# Patient Record
Sex: FEMALE | Race: WHITE | NOT HISPANIC OR LATINO | Employment: UNEMPLOYED | URBAN - NONMETROPOLITAN AREA
[De-identification: names, ages, dates, MRNs, and addresses within clinical notes are randomized per-mention and may not be internally consistent; named-entity substitution may affect disease eponyms.]

---

## 2017-05-03 ENCOUNTER — HOSPITAL ENCOUNTER (EMERGENCY)
Facility: HOSPITAL | Age: 22
Discharge: HOME | End: 2017-05-03
Attending: INTERNAL MEDICINE | Admitting: INTERNAL MEDICINE
Payer: COMMERCIAL

## 2017-05-03 VITALS
DIASTOLIC BLOOD PRESSURE: 79 MMHG | RESPIRATION RATE: 18 BRPM | OXYGEN SATURATION: 98 % | TEMPERATURE: 98 F | SYSTOLIC BLOOD PRESSURE: 123 MMHG | HEART RATE: 78 BPM

## 2017-05-03 DIAGNOSIS — F32.9 REACTIVE DEPRESSION: ICD-10-CM

## 2017-05-03 DIAGNOSIS — F10.10 ALCOHOL ABUSE: ICD-10-CM

## 2017-05-03 LAB
ALBUMIN SERPL-MCNC: 3.9 G/DL (ref 3.4–5)
ALBUMIN UR-MCNC: NEGATIVE MG/DL
ALP SERPL-CCNC: 53 U/L (ref 40–150)
ALT SERPL W P-5'-P-CCNC: 17 U/L (ref 0–50)
AMPHETAMINES UR QL SCN: ABNORMAL
ANION GAP SERPL CALCULATED.3IONS-SCNC: 8 MMOL/L (ref 3–14)
APPEARANCE UR: CLEAR
AST SERPL W P-5'-P-CCNC: 15 U/L (ref 0–45)
BACTERIA #/AREA URNS HPF: ABNORMAL /HPF
BARBITURATES UR QL: ABNORMAL
BASOPHILS # BLD AUTO: 0 10E9/L (ref 0–0.2)
BASOPHILS NFR BLD AUTO: 0.5 %
BENZODIAZ UR QL: ABNORMAL
BILIRUB SERPL-MCNC: 0.3 MG/DL (ref 0.2–1.3)
BILIRUB UR QL STRIP: NEGATIVE
BUN SERPL-MCNC: 5 MG/DL (ref 7–30)
CALCIUM SERPL-MCNC: 8.3 MG/DL (ref 8.5–10.1)
CANNABINOIDS UR QL SCN: ABNORMAL
CHLORIDE SERPL-SCNC: 109 MMOL/L (ref 94–109)
CO2 SERPL-SCNC: 27 MMOL/L (ref 20–32)
COCAINE UR QL: ABNORMAL
COLOR UR AUTO: ABNORMAL
CREAT SERPL-MCNC: 0.71 MG/DL (ref 0.52–1.04)
DIFFERENTIAL METHOD BLD: ABNORMAL
EOSINOPHIL # BLD AUTO: 0.3 10E9/L (ref 0–0.7)
EOSINOPHIL NFR BLD AUTO: 4.8 %
ERYTHROCYTE [DISTWIDTH] IN BLOOD BY AUTOMATED COUNT: 12.9 % (ref 10–15)
ETHANOL SERPL-MCNC: 0.14 G/DL
GFR SERPL CREATININE-BSD FRML MDRD: ABNORMAL ML/MIN/1.7M2
GLUCOSE SERPL-MCNC: 94 MG/DL (ref 70–99)
GLUCOSE UR STRIP-MCNC: NEGATIVE MG/DL
HCG UR QL: NEGATIVE
HCT VFR BLD AUTO: 40 % (ref 35–47)
HGB BLD-MCNC: 13.6 G/DL (ref 11.7–15.7)
HGB UR QL STRIP: NEGATIVE
HYALINE CASTS #/AREA URNS LPF: 2 /LPF (ref 0–2)
IMM GRANULOCYTES # BLD: 0 10E9/L (ref 0–0.4)
IMM GRANULOCYTES NFR BLD: 0.2 %
KETONES UR STRIP-MCNC: NEGATIVE MG/DL
LEUKOCYTE ESTERASE UR QL STRIP: ABNORMAL
LYMPHOCYTES # BLD AUTO: 2.2 10E9/L (ref 0.8–5.3)
LYMPHOCYTES NFR BLD AUTO: 35.4 %
MCH RBC QN AUTO: 33.5 PG (ref 26.5–33)
MCHC RBC AUTO-ENTMCNC: 34 G/DL (ref 31.5–36.5)
MCV RBC AUTO: 99 FL (ref 78–100)
METHADONE UR QL SCN: ABNORMAL
MONOCYTES # BLD AUTO: 0.5 10E9/L (ref 0–1.3)
MONOCYTES NFR BLD AUTO: 7.5 %
MUCOUS THREADS #/AREA URNS LPF: PRESENT /LPF
NEUTROPHILS # BLD AUTO: 3.2 10E9/L (ref 1.6–8.3)
NEUTROPHILS NFR BLD AUTO: 51.6 %
NITRATE UR QL: NEGATIVE
NRBC # BLD AUTO: 0 10*3/UL
NRBC BLD AUTO-RTO: 0 /100
OPIATES UR QL SCN: ABNORMAL
PCP UR QL SCN: ABNORMAL
PH UR STRIP: 6 PH (ref 4.7–8)
PLATELET # BLD AUTO: 208 10E9/L (ref 150–450)
POTASSIUM SERPL-SCNC: 3.5 MMOL/L (ref 3.4–5.3)
PROT SERPL-MCNC: 7.5 G/DL (ref 6.8–8.8)
RBC # BLD AUTO: 4.06 10E12/L (ref 3.8–5.2)
RBC #/AREA URNS AUTO: 2 /HPF (ref 0–2)
SODIUM SERPL-SCNC: 144 MMOL/L (ref 133–144)
SP GR UR STRIP: 1 (ref 1–1.03)
SQUAMOUS #/AREA URNS AUTO: 1 /HPF (ref 0–1)
URN SPEC COLLECT METH UR: ABNORMAL
UROBILINOGEN UR STRIP-MCNC: NORMAL MG/DL (ref 0–2)
WBC # BLD AUTO: 6.3 10E9/L (ref 4–11)
WBC #/AREA URNS AUTO: 9 /HPF (ref 0–2)

## 2017-05-03 PROCEDURE — 80307 DRUG TEST PRSMV CHEM ANLYZR: CPT | Performed by: INTERNAL MEDICINE

## 2017-05-03 PROCEDURE — 99283 EMERGENCY DEPT VISIT LOW MDM: CPT | Performed by: INTERNAL MEDICINE

## 2017-05-03 PROCEDURE — 81025 URINE PREGNANCY TEST: CPT | Performed by: INTERNAL MEDICINE

## 2017-05-03 PROCEDURE — 81001 URINALYSIS AUTO W/SCOPE: CPT | Performed by: INTERNAL MEDICINE

## 2017-05-03 PROCEDURE — 80053 COMPREHEN METABOLIC PANEL: CPT | Performed by: INTERNAL MEDICINE

## 2017-05-03 PROCEDURE — 36415 COLL VENOUS BLD VENIPUNCTURE: CPT | Performed by: INTERNAL MEDICINE

## 2017-05-03 PROCEDURE — 85025 COMPLETE CBC W/AUTO DIFF WBC: CPT | Performed by: INTERNAL MEDICINE

## 2017-05-03 PROCEDURE — 80320 DRUG SCREEN QUANTALCOHOLS: CPT | Performed by: INTERNAL MEDICINE

## 2017-05-03 PROCEDURE — 99283 EMERGENCY DEPT VISIT LOW MDM: CPT

## 2017-05-03 NOTE — DISCHARGE INSTRUCTIONS
Depression  Depression is one of the most common mental health problems today. It is not just a state of unhappiness or sadness. It is a true disease. The cause seems to be related to a decrease in chemicals that transmit signals in the brain. Having a family history of depression, alcoholism, or suicide increases the risk. Chronic illness, chronic pain, migraine headaches and high emotional stress also increase the risk.  Depression is something we tend to recognize in others, but may have a hard time seeing in ourselves. It can show in many physical and emotional ways:    Loss of appetite    Over-eating    Not being able to sleep    Sleeping too much    Tiredness not related to physical exertion    Restlessness or irritability    Slowness of movement or speech    Feeling depressed or withdrawn    Loss of interest in things you once enjoyed    Trouble concentrating, poor memory, trouble making decisions    Thoughts of harming or killing oneself, or thoughts that life is not worth living    Low self-esteem  The treatment for depression may include both medicine and psychotherapy. Antidepressants can reduce suffering and can improve the ability to function during the depressed period. Therapy can offer emotional support and help you understand emotional factors that may be causing the depression.  Home care    On-going care and support helps people manage this disease.  Find a healthcare provider and therapist who meet your needs. Seek help when you feel like you may be getting ill.    Be kind to yourself. Make it a point to do things that you enjoy (gardening, walking in nature, going to a movie, etc.). Reward yourself for small successes.    Take care of your physical body. Eat a balanced diet (low in saturated fat and high in fruits and vegetables). Exercise at least 3 times a week for 30 minutes. Even mild-moderate exercise (like brisk walking) can make you feel better.    Avoid alcohol, which can make  depression worse.    Take medicine as prescribed.    Tell each of your healthcare providers about all of the prescription drugs, over-the-counter medicines, vitamins, and supplements you take. Certain supplements interact with medicines and can result in dangerous side effects. Ask your pharmacist when you have questions about drug interactions.    Talk with your family and trusted friends about your feelings and thoughts. Ask them to help you recognize behavior changes early so you can get help and, if needed, medicine can be adjusted.  Follow-up care  Follow up with your healthcare provider, or as advised.  Call 911  Call 911 if you:    Have suicidal thoughts, a suicide plan, and the means to carry out the plan    Have trouble breathing    Are very confused    Feel very drowsy or have trouble awakening    Faint or lose consciousness    Have new chest pain that becomes more severe, lasts longer, or spreads into your shoulder, arm, neck, jaw or back  When to seek medical advice  Call your healthcare provider right away if any of these occur:    Feeling extreme depression, fear, anxiety, or anger toward yourself or others    Feeling out of control    Feeling that you may try to harm yourself or another    Hearing voices that others do not hear    Seeing things that others do not see    Can t sleep or eat for 3 days in a row    Friends or family express concern over your behavior and ask you to seek help    1394-7137 [employer]. [address] [place] [address], [place] [address]. All rights reserved. This information is not intended as a substitute for professional medical care. Always follow your healthcare professional's instructions.        Alcohol Intoxication  Alcohol intoxication occurs when you drink alcohol faster than your liver can remove it from your system. The following facts are important to remember:    It can take 10 minutes or more to start to feel the effects of a drink, so you can easily get  "more intoxicated than you intended.    One drink may be more than 1 serving of alcohol. Depending on the drink, it can be 2 to 4 servings.    It takes about an hour for your body to metabolize (clear) 1 serving. If you have more than 1 drink, it can take a couple of hours or more.    Many things affect how drinks will affect you, including whether you ve eaten, how fast you drink, your size, how much you normally drink (or not), medications you take, chronic diseases you have, and gender.  Signs and symptoms of alcohol poisoning  The following are signs and symptoms of alcohol poisoning:  Mild impairment    Reduced inhibitions    Slurred speech    Drowsiness    Decreased fine motor skills  Moderate impairment    Erratic behavior, aggression, depression    Impaired judgment    Confusion    Concentration difficulties    Coordination problems  Severe impairment    Vomiting    Seizures    Unconsciousness    Cold, clammy    Slow or irregular breathing    Hypothermia (low body temperature)    Coma  Health effects  Alcohol abuse causes health problems. Sometimes this can happen after only drinking a  little.\" There is no set number of drinks or amount of alcohol that defines too much. The more you drink at one time, and the more frequently you drink determine both the short-term and long-term health effects. It affects all parts of your body and your health, including your:    Brain. Alcohol is a central nervous system depressant. It can damage parts of the brain that affect your balance, memory, thinking, and emotions. It can cause memory loss, blackouts, depression, agitation, sleep cycle changes, and seizures. These changes may or may not be reversible.    Heart and vascular system. Alcohol affects multiple areas. It can damage heart muscle causing cardiomyopathy, which is a weakening and stretching of the heart muscle. This can lead to trouble breathing, an irregular heartbeat, atrial fibrillation, leg swelling, and " heart failure. It makes the blood vessels stiffen causing hypertension (high blood pressure). All of these problems increase your risk of having heart attacks or strokes.    Liver. Alcohol causes fat to build up in the liver, affecting its normal function. This increases the risk for hepatitis, leading to abdominal pain, appetite loss, jaundice, bleeding problems, liver fibrosis, and cirrhosis. This in turn can affect your ability to fight off infections, and can cause diabetes. The liver changes prevent it from removing toxins in your blood that can cause encephalopathy. Signs of this are confusion, altered level of consciousness, personality changes, memory loss, seizures, coma, and death.    Pancreas. Alcohol can cause inflammation of the pancreas, or pancreatitis. This can cause pain in your abdomen, fever, and diabetes.    Immune system. Alcohol weakens your immune system in a number of ways. It suppresses your immune system making it harder to fight off infections and colds. You will also have a higher risk of certain infections like pneumonia and tuberculosis.    Cancer risk. Alcohol raises your risk of cancer of the mouth, esophagus, pharynx, larynx, liver, and breast.    Sexual function. Alcohol abuse can also lead to sexual problems.  Alcohol use during pregnancy may cause permanent damage to the growing baby.  Home care  The following guidelines will help you care for yourself at home:    Don't drink any more alcohol.    Don't drive until all effects of the alcohol have worn off.    Don't operate machinery that can cause injuries.    Get lots of rest over the next few days. Drink plenty of water and other non-alcoholic liquids. Try to eat regular meals.    If you have been drinking heavily on a daily basis, you may go through alcohol withdrawal. The usual symptoms last 3 to 4 days and may include nervousness, shakiness, nausea, sweating, sleeplessness, and can even cause seizures and a serious withdrawal  symptom called delirium tremens, or DTs. During this time, it is best that you stay with family or friends who can help and support you. You can also admit yourself to a residential detox program. If your symptoms are severe (seizures, severe shakiness, confusion), contact your doctor or call an ambulance for help (see below).   Follow-up care  If alcohol is a problem in your life, these are some organizations that can help you:    Alcoholics Anonymous offers support through a self-help fellowship. There are no dues or fees. See the Yellow Pages and call for time and place of meetings. Find AA online at www.aa.org.    [employer] offers support to families of alcohol users. Contact 679-783-6832, or online at www.al-anon.org.    National [place] on Alcoholism and Drug Dependence can be reached at 890-287-6120, or online at www.ncadd.org.    There are also inpatient and residential alcohol detox programs. Check the Internet or phonebook Yellow Pages under  [facility].   Call 911  Call 911 if any of these occur:    Trouble breathing or slow irregular breathing    Chest pain    Sudden weakness on one side of your body or sudden trouble speaking    Heavy bleeding or vomiting blood    Very drowsy or trouble awakening    Fainting or loss of consciousness    Rapid heart rate    Seizure  When to seek medical care  Get prompt medical attention if any of the following occur:    Severe shakiness     Fever over 100.4  F (38.0  C)    Confusion or hallucinations (seeing, hearing, or feeling things that are not there)    Pain in your upper abdomen that gets worse    Repeated vomiting    2169-8944 [employer]. [address] [place] [address], [place] [address]. All rights reserved. This information is not intended as a substitute for professional medical care. Always follow your healthcare professional's instructions.

## 2017-05-03 NOTE — ED AVS SNAPSHOT
[facility]    [address] [address]    Phone:  [phone]                                       Reshma Martinez   MRN: [id number]    Department:  [facility]   Date of Visit:  5/3/2017           After Visit Summary Signature Page     I have received my discharge instructions, and my questions have been answered. I have discussed any challenges I see with this plan with the nurse or doctor.    ..........................................................................................................................................  Patient/Patient Representative Signature      ..........................................................................................................................................  Patient Representative Print Name and Relationship to Patient    ..................................................               ................................................  Date                                            Time    ..........................................................................................................................................  Reviewed by Signature/Title    ...................................................              ..............................................  Date                                                            Time

## 2017-05-03 NOTE — ED NOTES
"Pt arrived with [place] [employer] for a psych evaluation.  Friend called reporting pt was sending text messages saying she wanted to harm herself.  Pt was initially arguing with staff about being here,  pt agreed to finally get in our scrubs.  Pt has approx 6 self inflicted superficial cuts to inner forearm (pt pulled arm away and won't let me see them.)  She reports she has a hx of cutting since she was 10 yrs old.  Pt keeps stating \"I am not staying I have a job and a 3 yr old kid.  I would never hurt myself.\" [employer] placed her on a hold. Security in place outside of room.  Pt texting on phone, instructed her she could keep her phone if she cooperates with staff.   "

## 2017-05-03 NOTE — ED NOTES
"Dec called and said \" we have one person ahead of the pt it will be about 45 mins to an hour before we will see the pt.\"  "

## 2017-05-03 NOTE — ED AVS SNAPSHOT
[facility]    [address] [address]    Phone:  [phone]                                       Reshma Martinez   MRN: [id number]    Department:  [facility]   Date of Visit:  5/3/2017           Patient Information     Date Of Birth          1995        Your diagnoses for this visit were:     Alcohol abuse     Reactive depression        You were seen by Gerald Rosales MD.      Follow-up Information     Follow up with [facility].    Contact information:    [address] [place] [address]  [phone]          Discharge Instructions         Depression  Depression is one of the most common mental health problems today. It is not just a state of unhappiness or sadness. It is a true disease. The cause seems to be related to a decrease in chemicals that transmit signals in the brain. Having a family history of depression, alcoholism, or suicide increases the risk. Chronic illness, chronic pain, migraine headaches and high emotional stress also increase the risk.  Depression is something we tend to recognize in others, but may have a hard time seeing in ourselves. It can show in many physical and emotional ways:    Loss of appetite    Over-eating    Not being able to sleep    Sleeping too much    Tiredness not related to physical exertion    Restlessness or irritability    Slowness of movement or speech    Feeling depressed or withdrawn    Loss of interest in things you once enjoyed    Trouble concentrating, poor memory, trouble making decisions    Thoughts of harming or killing oneself, or thoughts that life is not worth living    Low self-esteem  The treatment for depression may include both medicine and psychotherapy. Antidepressants can reduce suffering and can improve the ability to function during the depressed period. Therapy can offer emotional support and help you understand emotional factors that may be causing the depression.  Home  care    On-going care and support helps people manage this disease.  Find a healthcare provider and therapist who meet your needs. Seek help when you feel like you may be getting ill.    Be kind to yourself. Make it a point to do things that you enjoy (gardening, walking in nature, going to a movie, etc.). Reward yourself for small successes.    Take care of your physical body. Eat a balanced diet (low in saturated fat and high in fruits and vegetables). Exercise at least 3 times a week for 30 minutes. Even mild-moderate exercise (like brisk walking) can make you feel better.    Avoid alcohol, which can make depression worse.    Take medicine as prescribed.    Tell each of your healthcare providers about all of the prescription drugs, over-the-counter medicines, vitamins, and supplements you take. Certain supplements interact with medicines and can result in dangerous side effects. Ask your pharmacist when you have questions about drug interactions.    Talk with your family and trusted friends about your feelings and thoughts. Ask them to help you recognize behavior changes early so you can get help and, if needed, medicine can be adjusted.  Follow-up care  Follow up with your healthcare provider, or as advised.  Call 911  Call 911 if you:    Have suicidal thoughts, a suicide plan, and the means to carry out the plan    Have trouble breathing    Are very confused    Feel very drowsy or have trouble awakening    Faint or lose consciousness    Have new chest pain that becomes more severe, lasts longer, or spreads into your shoulder, arm, neck, jaw or back  When to seek medical advice  Call your healthcare provider right away if any of these occur:    Feeling extreme depression, fear, anxiety, or anger toward yourself or others    Feeling out of control    Feeling that you may try to harm yourself or another    Hearing voices that others do not hear    Seeing things that others do not see    Can t sleep or eat for 3  "days in a row    Friends or family express concern over your behavior and ask you to seek help    [phone] [employer]. [address] [place] [address], [place] [address]. All rights reserved. This information is not intended as a substitute for professional medical care. Always follow your healthcare professional's instructions.        Alcohol Intoxication  Alcohol intoxication occurs when you drink alcohol faster than your liver can remove it from your system. The following facts are important to remember:    It can take 10 minutes or more to start to feel the effects of a drink, so you can easily get more intoxicated than you intended.    One drink may be more than 1 serving of alcohol. Depending on the drink, it can be 2 to 4 servings.    It takes about an hour for your body to metabolize (clear) 1 serving. If you have more than 1 drink, it can take a couple of hours or more.    Many things affect how drinks will affect you, including whether you ve eaten, how fast you drink, your size, how much you normally drink (or not), medications you take, chronic diseases you have, and gender.  Signs and symptoms of alcohol poisoning  The following are signs and symptoms of alcohol poisoning:  Mild impairment    Reduced inhibitions    Slurred speech    Drowsiness    Decreased fine motor skills  Moderate impairment    Erratic behavior, aggression, depression    Impaired judgment    Confusion    Concentration difficulties    Coordination problems  Severe impairment    Vomiting    Seizures    Unconsciousness    Cold, clammy    Slow or irregular breathing    Hypothermia (low body temperature)    Coma  Health effects  Alcohol abuse causes health problems. Sometimes this can happen after only drinking a  little.\" There is no set number of drinks or amount of alcohol that defines too much. The more you drink at one time, and the more frequently you drink determine both the short-term and long-term health effects. It " affects all parts of your body and your health, including your:    Brain. Alcohol is a central nervous system depressant. It can damage parts of the brain that affect your balance, memory, thinking, and emotions. It can cause memory loss, blackouts, depression, agitation, sleep cycle changes, and seizures. These changes may or may not be reversible.    Heart and vascular system. Alcohol affects multiple areas. It can damage heart muscle causing cardiomyopathy, which is a weakening and stretching of the heart muscle. This can lead to trouble breathing, an irregular heartbeat, atrial fibrillation, leg swelling, and heart failure. It makes the blood vessels stiffen causing hypertension (high blood pressure). All of these problems increase your risk of having heart attacks or strokes.    Liver. Alcohol causes fat to build up in the liver, affecting its normal function. This increases the risk for hepatitis, leading to abdominal pain, appetite loss, jaundice, bleeding problems, liver fibrosis, and cirrhosis. This in turn can affect your ability to fight off infections, and can cause diabetes. The liver changes prevent it from removing toxins in your blood that can cause encephalopathy. Signs of this are confusion, altered level of consciousness, personality changes, memory loss, seizures, coma, and death.    Pancreas. Alcohol can cause inflammation of the pancreas, or pancreatitis. This can cause pain in your abdomen, fever, and diabetes.    Immune system. Alcohol weakens your immune system in a number of ways. It suppresses your immune system making it harder to fight off infections and colds. You will also have a higher risk of certain infections like pneumonia and tuberculosis.    Cancer risk. Alcohol raises your risk of cancer of the mouth, esophagus, pharynx, larynx, liver, and breast.    Sexual function. Alcohol abuse can also lead to sexual problems.  Alcohol use during pregnancy may cause permanent damage to the  growing baby.  Home care  The following guidelines will help you care for yourself at home:    Don't drink any more alcohol.    Don't drive until all effects of the alcohol have worn off.    Don't operate machinery that can cause injuries.    Get lots of rest over the next few days. Drink plenty of water and other non-alcoholic liquids. Try to eat regular meals.    If you have been drinking heavily on a daily basis, you may go through alcohol withdrawal. The usual symptoms last 3 to 4 days and may include nervousness, shakiness, nausea, sweating, sleeplessness, and can even cause seizures and a serious withdrawal symptom called delirium tremens, or DTs. During this time, it is best that you stay with family or friends who can help and support you. You can also admit yourself to a residential detox program. If your symptoms are severe (seizures, severe shakiness, confusion), contact your doctor or call an ambulance for help (see below).   Follow-up care  If alcohol is a problem in your life, these are some organizations that can help you:    Alcoholics Anonymous offers support through a self-help fellowship. There are no dues or fees. See the Yellow Pages and call for time and place of meetings. Find AA online at www.aa.org.    [employer] offers support to families of alcohol users. Contact 254-503-6444, or online at www.al-anon.org.    National [place] on Alcoholism and Drug Dependence can be reached at 468-396-0430, or online at www.ncadd.org.    There are also inpatient and residential alcohol detox programs. Check the Internet or phonebook Yellow Pages under  Drug Abuse and Treatment Centers.   Call 911  Call 911 if any of these occur:    Trouble breathing or slow irregular breathing    Chest pain    Sudden weakness on one side of your body or sudden trouble speaking    Heavy bleeding or vomiting blood    Very drowsy or trouble awakening    Fainting or loss of consciousness    Rapid heart rate    Seizure  When to  "seek medical care  Get prompt medical attention if any of the following occur:    Severe shakiness     Fever over 100.4  F (38.0  C)    Confusion or hallucinations (seeing, hearing, or feeling things that are not there)    Pain in your upper abdomen that gets worse    Repeated vomiting    [phone] [employer]. [address] [place] [address]. All rights reserved. This information is not intended as a substitute for professional medical care. Always follow your healthcare professional's instructions.          Future Appointments        Provider Department Dept Phone Center    7/12/2017 2:00 PM Britni Ludwig MD, MD [facility] [phone] [facility] [place]         Review of your medicines      Notice     You have not been prescribed any medications.            Procedures and tests performed during your visit     Alcohol ethyl    CBC with platelets differential    Comprehensive metabolic panel    Drug Screen Urine ([facility])    HCG qualitative urine    UA reflex to Microscopic      Orders Needing Specimen Collection     None      Pending Results     Date and Time Order Name Status Description    5/3/2017 0435 Drug Screen Urine ([facility]) In process             Pending Culture Results     Date and Time Order Name Status Description    5/3/2017 0435 Drug Screen Urine ([facility]) In process             Thank you for choosing [facility]       Thank you for choosing [facility] for your care. Our goal is always to provide you with excellent care. Hearing back from our patients is one way we can continue to improve our services. Please take a few minutes to complete the written survey that you may receive in the mail after you visit with us. Thank you!        MyChart Information     [email or web address] lets you send messages to your doctor, view your test results, renew your prescriptions, schedule appointments and more. To sign up, go to [email or web address] . Click on \"Log in\" on the left side of the screen, " "which will take you to the Welcome page. Then click on \"Sign up Now\" on the right side of the page.     You will be asked to enter the access code listed below, as well as some personal information. Please follow the directions to create your username and password.     Your access code is: [email or web address]  Expires: 5/15/2017 12:17 PM     Your access code will  in 90 days. If you need help or a new code, please call your [facility] or [phone].        Care EveryWhere ID     This is your Care EveryWhere ID. This could be used by other organizations to access your [facility] medical records  [id number]        After Visit Summary       This is your record. Keep this with you and show to your community pharmacist(s) and doctor(s) at your next visit.                  "

## 2017-05-05 ASSESSMENT — ENCOUNTER SYMPTOMS
WOUND: 0
SLEEP DISTURBANCE: 0
ABDOMINAL PAIN: 0
DYSURIA: 0
CHILLS: 0
DIAPHORESIS: 0
HEMATURIA: 0
VOMITING: 0
SHORTNESS OF BREATH: 0
PALPITATIONS: 0
DELUSIONS: 0
ANAL BLEEDING: 0
DECREASED CONCENTRATION: 0
BACK PAIN: 0
VOICE CHANGE: 0
FEVER: 0
MYALGIAS: 0
NUMBNESS: 0
DIZZINESS: 0
NAUSEA: 0
BLOOD IN STOOL: 0
COLOR CHANGE: 0
COUGH: 0
HEADACHES: 0
WHEEZING: 0
ABDOMINAL DISTENTION: 0
NECK STIFFNESS: 0
NECK PAIN: 0
ARTHRALGIAS: 0
NERVOUS/ANXIOUS: 0
AGITATION: 0
DISORGANIZED SPEECH: 0
DYSPHORIC MOOD: 0
DEPRESSED MOOD: 0
CHEST TIGHTNESS: 0
HYPERACTIVE: 0
CONFUSION: 0
FLANK PAIN: 0
AGGRESSION: 0
LIGHT-HEADEDNESS: 0
HOMICIDAL IDEAS: 0
HALLUCINATIONS: 0
PARANOIA: 0
BIZARRE BEHAVIOR: 0

## 2017-05-06 NOTE — ED PROVIDER NOTES
History     Chief Complaint   Patient presents with     Psychiatric Evaluation     Patient is a 22 year old female presenting with mental health disorder. The history is provided by the patient and the police.   Mental Health Problem   Presenting symptoms: no aggressive behavior, no agitation, no bizarre behavior, no delusions, no depression, no disorganized speech, no disorganized thought process, no hallucinations, no homicidal ideas, no paranoid behavior, no self-mutilation, no suicidal thoughts, no suicidal threats and no suicide attempt    Associated symptoms: no abdominal pain, no anxiety, no chest pain and no headaches          I have reviewed the Medications, Allergies, Past Medical and Surgical History, and Social History in the Epic system.    Review of Systems   Constitutional: Negative for chills, diaphoresis and fever.   HENT: Negative for voice change.    Eyes: Negative for visual disturbance.   Respiratory: Negative for cough, chest tightness, shortness of breath and wheezing.    Cardiovascular: Negative for chest pain, palpitations and leg swelling.   Gastrointestinal: Negative for abdominal distention, abdominal pain, anal bleeding, blood in stool, nausea and vomiting.   Genitourinary: Negative for decreased urine volume, dysuria, flank pain and hematuria.   Musculoskeletal: Negative for arthralgias, back pain, gait problem, myalgias, neck pain and neck stiffness.   Skin: Negative for color change, pallor, rash and wound.   Neurological: Negative for dizziness, syncope, light-headedness, numbness and headaches.   Psychiatric/Behavioral: Negative for agitation, behavioral problems, confusion, decreased concentration, dysphoric mood, hallucinations, homicidal ideas, paranoia, self-injury, sleep disturbance and suicidal ideas. The patient is not nervous/anxious and is not hyperactive.        Physical Exam   BP: 124/88  Pulse: 97  Temp: 97.2  F (36.2  C)  Resp: 16  SpO2: 100 %  Physical Exam    Constitutional: She is oriented to person, place, and time. She appears well-developed and well-nourished.   HENT:   Head: Normocephalic and atraumatic.   Mouth/Throat: No oropharyngeal exudate.   Eyes: Conjunctivae are normal. Pupils are equal, round, and reactive to light.   Neck: Normal range of motion. Neck supple. No JVD present. No tracheal deviation present. No thyromegaly present.   Cardiovascular: Normal rate, regular rhythm, normal heart sounds and intact distal pulses.  Exam reveals no gallop and no friction rub.    No murmur heard.  Pulmonary/Chest: Effort normal and breath sounds normal. No stridor. No respiratory distress. She has no wheezes. She has no rales. She exhibits no tenderness.   Abdominal: Soft. Bowel sounds are normal. She exhibits no distension and no mass. There is no tenderness. There is no rebound and no guarding.   Musculoskeletal: Normal range of motion. She exhibits no edema or tenderness.   Lymphadenopathy:     She has no cervical adenopathy.   Neurological: She is alert and oriented to person, place, and time.   Skin: Skin is warm and dry. No rash noted. No erythema. No pallor.        Multiple superificial cut on left forearm   Psychiatric: She has a normal mood and affect. Her speech is normal and behavior is normal. Judgment normal. Her mood appears not anxious. Her affect is not angry, not blunt, not labile and not inappropriate. Thought content is not paranoid and not delusional. Cognition and memory are normal. She does not exhibit a depressed mood. She expresses no homicidal and no suicidal ideation. She expresses no suicidal plans and no homicidal plans.   Nursing note and vitals reviewed.      ED Course     ED Course     Procedures               Labs Ordered and Resulted from Time of ED Arrival Up to the Time of Departure from the ED   DRUG SCREEN URINE ([facility]) - Abnormal; Notable for the following:        Result Value    Opiates Qualitative Urine   (*)     Value:  Positive   Cutoff for a positive opiate is greater than 300 ng/mL. This is an unconfirmed   screening result to be used for medical purposes only.      All other components within normal limits   URINE MACROSCOPIC WITH REFLEX TO MICRO - Abnormal; Notable for the following:     Leukocyte Esterase Urine Small (*)     WBC Urine 9 (*)     Bacteria Urine None (*)     Mucous Urine Present (*)     Hyaline Casts 2 (*)     All other components within normal limits   ALCOHOL ETHYL - Abnormal; Notable for the following:     Ethanol g/dL 0.14 (*)     All other components within normal limits   CBC WITH PLATELETS DIFFERENTIAL - Abnormal; Notable for the following:     MCH 33.5 (*)     All other components within normal limits   COMPREHENSIVE METABOLIC PANEL - Abnormal; Notable for the following:     Urea Nitrogen 5 (*)     Calcium 8.3 (*)     All other components within normal limits   HCG QUALITATIVE URINE       Assessments & Plan (with Medical Decision Making)   Brought because she sent message to her boyfriend that she wants hurt herself  In ER [facility], proper congnitive status and judgment is normal , mood is normal .   When I asked what was the reason that she texted her boyfriend regarding suicidal comment, she answered she just needed more attention from her boyfriend and never thought will end up to hospital  Also she declared that she cut her arm with carefully superficial tor relieve her stress and shows affection to her boyfriend  In my opinion she is not at risk of hurting herself or others  She agreed to follow with her therapist  I have reviewed the nursing notes.    I have reviewed the findings, diagnosis, plan and need for follow up with the patient.    There are no discharge medications for this patient.      Final diagnoses:   Alcohol abuse   Reactive depression       5/3/2017   [facility]     Gerald Rosalse MD  05/05/17 9768

## 2017-08-29 ENCOUNTER — HOSPITAL ENCOUNTER (EMERGENCY)
Facility: HOSPITAL | Age: 22
Discharge: HOME | End: 2017-08-29
Payer: COMMERCIAL

## 2017-08-29 VITALS
SYSTOLIC BLOOD PRESSURE: 131 MMHG | TEMPERATURE: 99.1 F | DIASTOLIC BLOOD PRESSURE: 78 MMHG | HEART RATE: 70 BPM | OXYGEN SATURATION: 98 % | RESPIRATION RATE: 17 BRPM

## 2017-08-29 DIAGNOSIS — N83.201 RIGHT OVARIAN CYST: ICD-10-CM

## 2017-08-29 DIAGNOSIS — N39.0 URINARY TRACT INFECTION WITH HEMATURIA, SITE UNSPECIFIED: ICD-10-CM

## 2017-08-29 DIAGNOSIS — R31.9 URINARY TRACT INFECTION WITH HEMATURIA, SITE UNSPECIFIED: ICD-10-CM

## 2017-08-29 LAB
ALBUMIN SERPL-MCNC: 3.7 G/DL (ref 3.4–5)
ALBUMIN UR-MCNC: 10 MG/DL
ALP SERPL-CCNC: 69 U/L (ref 40–150)
ALT SERPL W P-5'-P-CCNC: 21 U/L (ref 0–50)
AMPHETAMINES UR QL SCN: NEGATIVE
ANION GAP SERPL CALCULATED.3IONS-SCNC: 8 MMOL/L (ref 3–14)
APPEARANCE UR: ABNORMAL
AST SERPL W P-5'-P-CCNC: 16 U/L (ref 0–45)
BACTERIA #/AREA URNS HPF: ABNORMAL /HPF
BARBITURATES UR QL: NEGATIVE
BASOPHILS # BLD AUTO: 0.1 10E9/L (ref 0–0.2)
BASOPHILS NFR BLD AUTO: 0.5 %
BENZODIAZ UR QL: NEGATIVE
BILIRUB SERPL-MCNC: 0.5 MG/DL (ref 0.2–1.3)
BILIRUB UR QL STRIP: NEGATIVE
BUN SERPL-MCNC: 8 MG/DL (ref 7–30)
CALCIUM SERPL-MCNC: 9 MG/DL (ref 8.5–10.1)
CANNABINOIDS UR QL SCN: POSITIVE
CHLORIDE SERPL-SCNC: 107 MMOL/L (ref 94–109)
CO2 SERPL-SCNC: 25 MMOL/L (ref 20–32)
COCAINE UR QL: NEGATIVE
COLOR UR AUTO: YELLOW
CREAT SERPL-MCNC: 0.7 MG/DL (ref 0.52–1.04)
DIFFERENTIAL METHOD BLD: ABNORMAL
EOSINOPHIL # BLD AUTO: 0.2 10E9/L (ref 0–0.7)
EOSINOPHIL NFR BLD AUTO: 1.7 %
ERYTHROCYTE [DISTWIDTH] IN BLOOD BY AUTOMATED COUNT: 12.8 % (ref 10–15)
GFR SERPL CREATININE-BSD FRML MDRD: >90 ML/MIN/1.7M2
GLUCOSE SERPL-MCNC: 81 MG/DL (ref 70–99)
GLUCOSE UR STRIP-MCNC: NEGATIVE MG/DL
HCG UR QL: NEGATIVE
HCT VFR BLD AUTO: 42.9 % (ref 35–47)
HGB BLD-MCNC: 15 G/DL (ref 11.7–15.7)
HGB UR QL STRIP: ABNORMAL
IMM GRANULOCYTES # BLD: 0.1 10E9/L (ref 0–0.4)
IMM GRANULOCYTES NFR BLD: 0.5 %
KETONES UR STRIP-MCNC: 10 MG/DL
LEUKOCYTE ESTERASE UR QL STRIP: ABNORMAL
LYMPHOCYTES # BLD AUTO: 1.1 10E9/L (ref 0.8–5.3)
LYMPHOCYTES NFR BLD AUTO: 11 %
MCH RBC QN AUTO: 34.6 PG (ref 26.5–33)
MCHC RBC AUTO-ENTMCNC: 35 G/DL (ref 31.5–36.5)
MCV RBC AUTO: 99 FL (ref 78–100)
METHADONE UR QL SCN: NEGATIVE
MONOCYTES # BLD AUTO: 0.9 10E9/L (ref 0–1.3)
MONOCYTES NFR BLD AUTO: 9.4 %
MUCOUS THREADS #/AREA URNS LPF: PRESENT /LPF
NEUTROPHILS # BLD AUTO: 7.6 10E9/L (ref 1.6–8.3)
NEUTROPHILS NFR BLD AUTO: 76.9 %
NITRATE UR QL: NEGATIVE
NRBC # BLD AUTO: 0 10*3/UL
NRBC BLD AUTO-RTO: 0 /100
OPIATES UR QL SCN: NEGATIVE
PCP UR QL SCN: NEGATIVE
PH UR STRIP: 5.5 PH (ref 4.7–8)
PLATELET # BLD AUTO: 186 10E9/L (ref 150–450)
POTASSIUM SERPL-SCNC: 3.9 MMOL/L (ref 3.4–5.3)
PROT SERPL-MCNC: 7.8 G/DL (ref 6.8–8.8)
RBC # BLD AUTO: 4.34 10E12/L (ref 3.8–5.2)
RBC #/AREA URNS AUTO: 6 /HPF (ref 0–2)
SODIUM SERPL-SCNC: 140 MMOL/L (ref 133–144)
SOURCE: ABNORMAL
SP GR UR STRIP: 1.01 (ref 1–1.03)
SQUAMOUS #/AREA URNS AUTO: 1 /HPF (ref 0–1)
TRANS CELLS #/AREA URNS HPF: 1 /HPF (ref 0–1)
UROBILINOGEN UR STRIP-MCNC: NORMAL MG/DL (ref 0–2)
WBC # BLD AUTO: 9.9 10E9/L (ref 4–11)
WBC #/AREA URNS AUTO: 68 /HPF (ref 0–2)
WBC CLUMPS #/AREA URNS HPF: PRESENT /HPF

## 2017-08-29 PROCEDURE — 74176 CT ABD & PELVIS W/O CONTRAST: CPT | Mod: TC

## 2017-08-29 PROCEDURE — 87186 SC STD MICRODIL/AGAR DIL: CPT

## 2017-08-29 PROCEDURE — 80053 COMPREHEN METABOLIC PANEL: CPT

## 2017-08-29 PROCEDURE — 99285 EMERGENCY DEPT VISIT HI MDM: CPT

## 2017-08-29 PROCEDURE — 87491 CHLMYD TRACH DNA AMP PROBE: CPT

## 2017-08-29 PROCEDURE — 25000132 ZZH RX MED GY IP 250 OP 250 PS 637

## 2017-08-29 PROCEDURE — 85025 COMPLETE CBC W/AUTO DIFF WBC: CPT

## 2017-08-29 PROCEDURE — 87088 URINE BACTERIA CULTURE: CPT

## 2017-08-29 PROCEDURE — 80307 DRUG TEST PRSMV CHEM ANLYZR: CPT

## 2017-08-29 PROCEDURE — 81001 URINALYSIS AUTO W/SCOPE: CPT | Mod: 59

## 2017-08-29 PROCEDURE — 87591 N.GONORRHOEAE DNA AMP PROB: CPT

## 2017-08-29 PROCEDURE — 81025 URINE PREGNANCY TEST: CPT

## 2017-08-29 PROCEDURE — 36415 COLL VENOUS BLD VENIPUNCTURE: CPT

## 2017-08-29 PROCEDURE — 99284 EMERGENCY DEPT VISIT MOD MDM: CPT | Mod: 25

## 2017-08-29 PROCEDURE — 87086 URINE CULTURE/COLONY COUNT: CPT

## 2017-08-29 RX ORDER — ACETAMINOPHEN 325 MG/1
975 TABLET ORAL ONCE
Status: COMPLETED | OUTPATIENT
Start: 2017-08-29 | End: 2017-08-29

## 2017-08-29 RX ORDER — CIPROFLOXACIN 500 MG/1
500 TABLET, FILM COATED ORAL 2 TIMES DAILY
Qty: 14 TABLET | Refills: 0 | Status: SHIPPED | OUTPATIENT
Start: 2017-08-29 | End: 2017-09-05

## 2017-08-29 RX ORDER — IBUPROFEN 800 MG/1
800 TABLET, FILM COATED ORAL ONCE
Status: COMPLETED | OUTPATIENT
Start: 2017-08-29 | End: 2017-08-29

## 2017-08-29 RX ORDER — IBUPROFEN 800 MG/1
800 TABLET, FILM COATED ORAL EVERY 8 HOURS PRN
Qty: 60 TABLET | Refills: 0 | Status: SHIPPED | OUTPATIENT
Start: 2017-08-29 | End: 2017-09-06

## 2017-08-29 RX ORDER — CIPROFLOXACIN 500 MG/1
500 TABLET, FILM COATED ORAL ONCE
Status: COMPLETED | OUTPATIENT
Start: 2017-08-29 | End: 2017-08-29

## 2017-08-29 RX ADMIN — ACETAMINOPHEN 975 MG: 325 TABLET, FILM COATED ORAL at 19:33

## 2017-08-29 RX ADMIN — IBUPROFEN 800 MG: 800 TABLET ORAL at 19:33

## 2017-08-29 RX ADMIN — CIPROFLOXACIN HYDROCHLORIDE 500 MG: 500 TABLET, FILM COATED ORAL at 19:33

## 2017-08-29 ASSESSMENT — ENCOUNTER SYMPTOMS
SHORTNESS OF BREATH: 0
ABDOMINAL PAIN: 1
CONFUSION: 0
NERVOUS/ANXIOUS: 1
ARTHRALGIAS: 0
FREQUENCY: 1
COLOR CHANGE: 0
HEADACHES: 0
FEVER: 0
EYE REDNESS: 0
NECK STIFFNESS: 0
DIFFICULTY URINATING: 0

## 2017-08-29 NOTE — ED AVS SNAPSHOT
[facility]    [address] [address]    Phone:  [phone]                                       Reshma Martinez   MRN: [id number]    Department:  [facility]   Date of Visit:  8/29/2017           Patient Information     Date Of Birth          1995        Your diagnoses for this visit were:     Urinary tract infection with hematuria, site unspecified     Right ovarian cyst        You were seen by Yaquelin Jane APRN FNP.      Follow-up Information     Follow up with PCP In 1 week.    Why:  recheck        Follow up with [facility].    Specialty:  EMERGENCY MEDICINE    Why:  As needed, If symptoms worsen    Contact information:    [address] [address] [phone]    Additional information:    From [facility]: Take [address]. Turn left at [address]. Turn left at the first stoplight on [address]. At the first stop sign, take a right onto [address]. Take a left into the parking lot and continue through until you reach the North enterance of the building.       From [place]: Take [address]. Take the [address] ramp towards [place]. Turn left onto [address]. Take a slight right onto [address]. Turn left at the first stoplight on [address]. At the first stop sign, take a right onto [address]. Take a left into the parking lot and continue through until you reach the North enterance of the building.       From [place]: Take [address]. Take a right at [address]. At the first stop sign, take a right onto [address]. Take a left into the parking lot and continue through until you reach the North enterance of the building.         Discharge Instructions            See attached for home care  Rest, increase fluids, cranberry juice  Take all of antibiotics as prescribed  Take ibuprofen for pain  F/U with PCP if not improving or back to  base line in 1 week  Return to ED/UC with worsening symptoms.   Ovarian Cysts    Ovarian cysts are sacs filled with fluid or tissue that form on or inside the ovaries. The ovaries are two small organs located on each side of a woman s uterus (womb). They are part of the female reproductive system.  Ovarian cysts are common in women, especially during childbearing years. There are different types of cysts. Most are harmless (benign) and go away on their own. They often cause no symptoms. If symptoms do occur, they can include mild pain or pressure in the lower belly (abdomen).  Cysts that are large or break (rupture) may cause more severe pain and symptoms. In these cases, hospital care or treatment such as surgery may be needed. More extensive treatment may also be needed if a cyst causes an ovary to twist (called torsion) or if a cyst is suspected to be cancerous. Keep in mind that most cysts are not cancerous, however.  General care    To help relieve pain, your healthcare provider may recommend using over-the-counter pain medicine. If needed, stronger pain medicine may be prescribed.    Depending on the type of cyst you have, your healthcare provider may advise taking birth control pills. These help shrink cysts in certain cases. They may also help prevent new cysts from forming. Be sure to take these medicines as directed if they are prescribed.    Your healthcare provider may advise you to watch your symptoms over time to see if they go away or worsen. Regular ultrasound tests may also be advised. These can help check if a cyst goes away or grows in size.  Follow-up care  Follow up with your healthcare provider, or as advised.  When to seek medical advice  Call your healthcare provider right away if any of these occur:    Pain worsens or fails to get better with home treatment    Fever of 100.4 F (38 C) or higher (or other fever amount directed by your healthcare provider)    Nausea and vomiting    Weakness,  "dizziness, or fainting    Abnormal vaginal bleeding  Date Last Reviewed: 6/11/2015 2000-2017 [employer]. [address] [place] [address], [place] [address]. All rights reserved. This information is not intended as a substitute for professional medical care. Always follow your healthcare professional's instructions.        * BLADDER INFECTION,Female (Adult)    A bladder infection (\"cystitis\" or \"UTI\") usually causes a constant urge to urinate and a burning when passing urine. Urine may be cloudy, smelly or dark. There may be pain in the lower abdomen. A bladder infection occurs when bacteria from the vaginal area enter the bladder opening (urethra). This can occur from sexual intercourse, wearing tight clothing, dehydration and other factors.  HOME CARE:  1. Drink lots of fluids (at least 6-8 glasses a day, unless you must restrict fluids for other medical reasons). This will force the medicine into your urinary system and flush the bacteria out of your body. Cranberry juice has been shown to help clear out the bacteria.  2. Avoid sexual intercourse until your symptoms are gone.  3. A bladder infection is treated with antibiotics. You may also be given Pyridium (generic = phenazopyridine) to reduce the burning sensation. This medicine will cause your urine to become a bright orange color. The orange urine may stain clothing. You may wear a pad or panty-liner to protect clothing.  PREVENTING FUTURE INFECTIONS:  1. Always wipe from front to back after a bowel movement.  2. Keep the genital area clean and dry.  3. Drink plenty of fluids each day to avoid dehydration.  4. Urinate right after intercourse to flush out the bladder.  5. Wear cotton underwear and cotton-lined panty hose; avoid tight-fitting pants.  6. If you are on birth control pills and are having frequent bladder infections, discuss with your doctor.  FOLLOW UP: Return to this facility or see your doctor if ALL symptoms are not gone after three " days of treatment.  GET PROMPT MEDICAL ATTENTION if any of the following occur:    Fever over 101 F (38.3 C)    No improvement by the third day of treatment    Increasing back or abdominal pain    Repeated vomiting; unable to keep medicine down    Weakness, dizziness or fainting    Vaginal discharge    Pain, redness or swelling in the labia (outer vaginal area)    7456-6455 [employer], [address] [place] [address], [place] [address]. All rights reserved. This information is not intended as a substitute for professional medical care. Always follow your healthcare professional's instructions.         Review of your medicines      START taking        Dose / Directions Last dose taken    ciprofloxacin 500 MG tablet   Commonly known as:  CIPRO   Dose:  500 mg   Quantity:  14 tablet        Take 1 tablet (500 mg) by mouth 2 times daily for 7 days   Refills:  0        ibuprofen 800 MG tablet   Commonly known as:  ADVIL/MOTRIN   Dose:  800 mg   Quantity:  60 tablet        Take 1 tablet (800 mg) by mouth every 8 hours as needed for moderate pain   Refills:  0                Prescriptions were sent or printed at these locations (2 Prescriptions)                   [facility] [address] [place] [address] [place] - [address] AT [facility] [address]   [address] [address]    Telephone:  [phone]   Fax:  [phone]   Hours:                  E-Prescribed (2 of 2)         ciprofloxacin (CIPRO) 500 MG tablet               ibuprofen (ADVIL/MOTRIN) 800 MG tablet                Procedures and tests performed during your visit     CBC with platelets differential    CT Abdomen Pelvis w/o Contrast (stone protocol)    Chlamydia trachomatis PCR    Comprehensive metabolic panel    Drug Screen Urine (Range)    HCG qualitative urine    Neisseria gonorrhoea PCR    UA reflex to Microscopic and Culture    Urine Culture Aerobic Bacterial      Orders Needing Specimen Collection     None      Pending Results     Date and Time  "Order Name Status Description    2017 1904 CT Abdomen Pelvis w/o Contrast (stone protocol) In process     2017 1828 Urine Culture Aerobic Bacterial In process     2017 1803 Neisseria gonorrhoea PCR In process     2017 1803 Chlamydia trachomatis PCR In process             Pending Culture Results     Date and Time Order Name Status Description    2017 1828 Urine Culture Aerobic Bacterial In process     2017 1803 Neisseria gonorrhoea PCR In process     2017 1803 Chlamydia trachomatis PCR In process             Thank you for choosing [facility]       Thank you for choosing [facility] for your care. Our goal is always to provide you with excellent care. Hearing back from our patients is one way we can continue to improve our services. Please take a few minutes to complete the written survey that you may receive in the mail after you visit with us. Thank you!        [facility] Information     [facility] lets you send messages to your doctor, view your test results, renew your prescriptions, schedule appointments and more. To sign up, go to [email or web address] . Click on \"Log in\" on the left side of the screen, which will take you to the Welcome page. Then click on \"Sign up Now\" on the right side of the page.     You will be asked to enter the access code listed below, as well as some personal information. Please follow the directions to create your username and password.     Your access code is: [email or web address]  Expires: 10/10/2017  2:56 PM     Your access code will  in 90 days. If you need help or a new code, please call your [facility] clinic or [phone].        Care EveryWhere ID     This is your Care EveryWhere ID. This could be used by other organizations to access your [facility] medical records  [id number]        Equal Access to Services     ENRIQUE JAMES AH: Lazaro Abraham, harsh onofre, connie guardado. So yair " [phone].    ATENCIÓN: Si habla español, tiene a gramajo disposición servicios gratuitos de asistencia lingüística. Llame al [phone].    We comply with applicable federal civil rights laws and [place] laws. We do not discriminate on the basis of race, color, national origin, age, disability sex, sexual orientation or gender identity.            After Visit Summary       This is your record. Keep this with you and show to your community pharmacist(s) and doctor(s) at your next visit.

## 2017-08-29 NOTE — ED PROVIDER NOTES
"  History     Chief Complaint   Patient presents with     Abdominal Pain     c/o abd pain, c/o hurts to move. denies nausea, vomiting or diarrhea. has her period      HPI  Reshma Martinez is a 22 year old female with hx of depression, ADHD, multiple std's, presents to ED for evaluation of suprapubic abdominal pain. Reports to menses 1 week ago as normal and subsided. Increased bleeding and cramping noted today. States \"toilet was full of blood\" just PTA. Unsure if bleeding is urethral or vaginal. Denies fever, no flank pain, no n/v/d.     Allergies   Allergen Reactions     Codeine Phosphate Other (See Comments)     Vomiting (Tylenol #3)         No current facility-administered medications on file prior to encounter.   No current outpatient prescriptions on file prior to encounter.    Patient Active Problem List   Diagnosis     Tobacco abuse     Depression     Attention deficit disorder     Nonpsychotic mental disorder     Multiple body piercings     Positive urine drug screen     Acute stress reaction     Need for HPV vaccination     Outbursts of anger     Need for vaccination     Status post vaginal delivery     Family planning     Vaginal spotting     Encounter for routine gynecological examination     Ecchymosis     Easy bruisability     Abdominal pain, generalized     Tobacco abuse     Chest pain     Alopecia     Ovarian mass     NO SHOW     Insufficient endocervical or transformation zone component in cervical specimen       Past Surgical History:   Procedure Laterality Date     DILATION AND CURETTAGE, HYSTEROSCOPY DIAGNOSTIC, COMBINED  4/11/2014    Procedure: DILATION AND CURETTAGE, HYSTEROSCOPY;  Surgeon: Horace Ledbetter MD;  Location: [facility]     [facility]  2012    right     removal of hardware hand  2012    right       Social History   Substance Use Topics     Smoking status: Current Every Day Smoker     Packs/day: 1.00     Years: 10.00     Types: Cigarettes     Smokeless tobacco: Never Used      Comment: 10 " "units per day, has not tried to quit, passive exposure     Alcohol use Yes      Comment: rarely       Most Recent Immunizations   Administered Date(s) Administered     DTAP (<7y) 08/06/1998     HIB 01/12/1995     HPVQuadrivalent 04/02/2014     HepB-Peds 08/06/1998     Influenza (IIV3) 09/24/2013     MMR 09/14/2005     OPV, trivalent, live 08/06/1998     Pneumococcal 23 valent 09/04/2013     TD (ADULT, 7+) 09/14/2005     TDAP Vaccine (Boostrix) 12/10/2013     Varicella 09/14/2005       BMI: Estimated body mass index is 25.06 kg/(m^2) as calculated from the following:    Height as of 6/27/16: 1.626 m (5' 4\").    Weight as of 6/27/16: 66.2 kg (146 lb).      Review of Systems   Constitutional: Negative for fever.   HENT: Negative for congestion.    Eyes: Negative for redness.   Respiratory: Negative for shortness of breath.    Cardiovascular: Negative for chest pain.   Gastrointestinal: Positive for abdominal pain.        Suprapubic pain, see HPI   Genitourinary: Positive for frequency, menstrual problem and pelvic pain. Negative for difficulty urinating and vaginal pain.   Musculoskeletal: Negative for arthralgias and neck stiffness.   Skin: Negative for color change.   Neurological: Negative for headaches.   Psychiatric/Behavioral: Negative for confusion. The patient is nervous/anxious.         Tearful       Physical Exam   BP: (!) 158/103  Heart Rate: 114  Temp: 98.7  F (37.1  C)  Resp: 18  SpO2: 98 %  Physical Exam   Constitutional: She is oriented to person, place, and time. No distress.   Eyes: Conjunctivae are normal. Pupils are equal, round, and reactive to light.   Neck: Normal range of motion.   Cardiovascular: Regular rhythm.    Pulmonary/Chest: Effort normal. No respiratory distress.   Abdominal: Soft. There is no hepatosplenomegaly. There is tenderness in the right lower quadrant and suprapubic area. There is no CVA tenderness and negative Akbar's sign.   Genitourinary: No bleeding in the vagina. "   Musculoskeletal: Normal range of motion.   Neurological: She is alert and oriented to person, place, and time.   Skin: Skin is warm. She is not diaphoretic. No erythema.   Nursing note and vitals reviewed.      ED Course     Procedures        Labs Ordered and Resulted from Time of ED Arrival Up to the Time of Departure from the ED   UA MACROSCOPIC WITH REFLEX TO MICRO AND CULTURE - Abnormal; Notable for the following:        Result Value    Ketones Urine 10 (*)     Blood Urine Trace (*)     Protein Albumin Urine 10 (*)     Leukocyte Esterase Urine Large (*)     RBC Urine 6 (*)     WBC Urine 68 (*)     WBC Clumps Present (*)     Bacteria Urine None (*)     Mucous Urine Present (*)     All other components within normal limits   DRUG SCREEN URINE (RANGE) - Abnormal; Notable for the following:     Cannabinoids Qual Urine Positive (*)     All other components within normal limits   CBC WITH PLATELETS DIFFERENTIAL - Abnormal; Notable for the following:     MCH 34.6 (*)     All other components within normal limits   COMPREHENSIVE METABOLIC PANEL   HCG QUALITATIVE URINE   CHLAMYDIA TRACHOMATIS PCR   NEISSERIA GONORRHOEAE PCR   URINE CULTURE AEROBIC BACTERIAL       Assessments & Plan (with Medical Decision Making)   Pt presents with suprapubic and right lower quadrant pain, hematuria, irregular menses. VSS, afebrile. Non toxic, Exam as above.  Labs obtained: cbc normal, cmp normal, UA postive for large amt LE, 6 rbc, trace blood, 68 wbc.   CT abdomen reveals no stones, normal appendix, 2.5 cm right ovarian cyst.   Findings consistent with UTI. Cipro, ibuprofen and tylenol given in the ED. Epic discharge instructions given. Pt verbalizes understanding and agrees with plan.  Pt discharged in stable condition.      I have reviewed the nursing notes.    I have reviewed the findings, diagnosis, plan and need for follow up with the patient.    Discharge Medication List as of 8/29/2017  8:11 PM      START taking these  medications    Details   ciprofloxacin (CIPRO) 500 MG tablet Take 1 tablet (500 mg) by mouth 2 times daily for 7 days, Disp-14 tablet, R-0, E-Prescribe      ibuprofen (ADVIL/MOTRIN) 800 MG tablet Take 1 tablet (800 mg) by mouth every 8 hours as needed for moderate pain, Disp-60 tablet, R-0, E-Prescribe             Final diagnoses:   Urinary tract infection with hematuria, site unspecified   Right ovarian cyst     See attached for home care  Rest, increase fluids, cranberry juice  Take all of antibiotics as prescribed  Take ibuprofen for pain  F/U with PCP if not improving or back to base line in 1 week  Return to ED/UC with worsening symptoms.         8/29/2017   [facility]     Yaquelin Jane APRN FNP  08/29/17 [id number]

## 2017-08-29 NOTE — ED AVS SNAPSHOT
[facility]    [address] [address]    Phone:  [phone]                                       Reshma Martinez   MRN: [id number]    Department:  [facility]   Date of Visit:  8/29/2017           After Visit Summary Signature Page     I have received my discharge instructions, and my questions have been answered. I have discussed any challenges I see with this plan with the nurse or doctor.    ..........................................................................................................................................  Patient/Patient Representative Signature      ..........................................................................................................................................  Patient Representative Print Name and Relationship to Patient    ..................................................               ................................................  Date                                            Time    ..........................................................................................................................................  Reviewed by Signature/Title    ...................................................              ..............................................  Date                                                            Time

## 2017-08-29 NOTE — ED NOTES
Pt presents tearful, holding her lower abd. States she got her periond a week ago Wednesday and it lasted for 10 hours. Saturday it returned and she has had cramping and clots. Wears a pad for 4-5 hours before needing to change pad. Does not feel she is pregnant. States her periods are irregular.

## 2017-08-30 NOTE — ED NOTES
Discharge instructions given. Verbalized understanding. Instructed that first dose of Cipro and Ibuprofen 800 mg were given here in ER so not to take them tonight and start in the morning. Verbalized understanding. Ambulated out of ED independently.

## 2017-08-30 NOTE — DISCHARGE INSTRUCTIONS
See attached for home care  Rest, increase fluids, cranberry juice  Take all of antibiotics as prescribed  Take ibuprofen for pain  F/U with PCP if not improving or back to base line in 1 week  Return to ED/UC with worsening symptoms.   Ovarian Cysts    Ovarian cysts are sacs filled with fluid or tissue that form on or inside the ovaries. The ovaries are two small organs located on each side of a woman s uterus (womb). They are part of the female reproductive system.  Ovarian cysts are common in women, especially during childbearing years. There are different types of cysts. Most are harmless (benign) and go away on their own. They often cause no symptoms. If symptoms do occur, they can include mild pain or pressure in the lower belly (abdomen).  Cysts that are large or break (rupture) may cause more severe pain and symptoms. In these cases, hospital care or treatment such as surgery may be needed. More extensive treatment may also be needed if a cyst causes an ovary to twist (called torsion) or if a cyst is suspected to be cancerous. Keep in mind that most cysts are not cancerous, however.  General care    To help relieve pain, your healthcare provider may recommend using over-the-counter pain medicine. If needed, stronger pain medicine may be prescribed.    Depending on the type of cyst you have, your healthcare provider may advise taking birth control pills. These help shrink cysts in certain cases. They may also help prevent new cysts from forming. Be sure to take these medicines as directed if they are prescribed.    Your healthcare provider may advise you to watch your symptoms over time to see if they go away or worsen. Regular ultrasound tests may also be advised. These can help check if a cyst goes away or grows in size.  Follow-up care  Follow up with your healthcare provider, or as advised.  When to seek medical advice  Call your healthcare provider right away if any of these occur:    Pain worsens  "or fails to get better with home treatment    Fever of 100.4 F (38 C) or higher (or other fever amount directed by your healthcare provider)    Nausea and vomiting    Weakness, dizziness, or fainting    Abnormal vaginal bleeding  Date Last Reviewed: 6/11/2015 2000-2017 [employer]. [address] [place] [address]. All rights reserved. This information is not intended as a substitute for professional medical care. Always follow your healthcare professional's instructions.        * BLADDER INFECTION,Female (Adult)    A bladder infection (\"cystitis\" or \"UTI\") usually causes a constant urge to urinate and a burning when passing urine. Urine may be cloudy, smelly or dark. There may be pain in the lower abdomen. A bladder infection occurs when bacteria from the vaginal area enter the bladder opening (urethra). This can occur from sexual intercourse, wearing tight clothing, dehydration and other factors.  HOME CARE:  1. Drink lots of fluids (at least 6-8 glasses a day, unless you must restrict fluids for other medical reasons). This will force the medicine into your urinary system and flush the bacteria out of your body. Cranberry juice has been shown to help clear out the bacteria.  2. Avoid sexual intercourse until your symptoms are gone.  3. A bladder infection is treated with antibiotics. You may also be given Pyridium (generic = phenazopyridine) to reduce the burning sensation. This medicine will cause your urine to become a bright orange color. The orange urine may stain clothing. You may wear a pad or panty-liner to protect clothing.  PREVENTING FUTURE INFECTIONS:  1. Always wipe from front to back after a bowel movement.  2. Keep the genital area clean and dry.  3. Drink plenty of fluids each day to avoid dehydration.  4. Urinate right after intercourse to flush out the bladder.  5. Wear cotton underwear and cotton-lined panty hose; avoid tight-fitting pants.  6. If you are on birth " control pills and are having frequent bladder infections, discuss with your doctor.  FOLLOW UP: Return to this facility or see your doctor if ALL symptoms are not gone after three days of treatment.  GET PROMPT MEDICAL ATTENTION if any of the following occur:    Fever over 101 F (38.3 C)    No improvement by the third day of treatment    Increasing back or abdominal pain    Repeated vomiting; unable to keep medicine down    Weakness, dizziness or fainting    Vaginal discharge    Pain, redness or swelling in the labia (outer vaginal area)    9354-6980 [employer], [address] [place] [address], [place] [address]. All rights reserved. This information is not intended as a substitute for professional medical care. Always follow your healthcare professional's instructions.

## 2017-08-31 LAB
BACTERIA SPEC CULT: ABNORMAL
C TRACH DNA SPEC QL NAA+PROBE: POSITIVE
N GONORRHOEA DNA SPEC QL NAA+PROBE: NEGATIVE
SPECIMEN SOURCE: ABNORMAL
SPECIMEN SOURCE: ABNORMAL
SPECIMEN SOURCE: NORMAL

## 2017-08-31 NOTE — PROGRESS NOTES
Pt called with positive results.  Instructed pt to practice safe sex, notify partners they need to be tested, and no sexual contact until Rx is gone.  Pt understands plan of care with no further questions.  Per PEGGY Bruce called in doxycyline 100 mg one tab BID x 7 days with no refills.

## 2017-09-03 ENCOUNTER — HOSPITAL ENCOUNTER (EMERGENCY)
Facility: HOSPITAL | Age: 22
Discharge: HOME | End: 2017-09-03
Payer: COMMERCIAL

## 2017-09-03 VITALS
RESPIRATION RATE: 16 BRPM | SYSTOLIC BLOOD PRESSURE: 120 MMHG | HEART RATE: 77 BPM | OXYGEN SATURATION: 98 % | TEMPERATURE: 98.1 F | DIASTOLIC BLOOD PRESSURE: 86 MMHG

## 2017-09-03 DIAGNOSIS — R10.11 RUQ ABDOMINAL PAIN: ICD-10-CM

## 2017-09-03 LAB
ALBUMIN SERPL-MCNC: 3.4 G/DL (ref 3.4–5)
ALBUMIN UR-MCNC: 30 MG/DL
ALP SERPL-CCNC: 72 U/L (ref 40–150)
ALT SERPL W P-5'-P-CCNC: 18 U/L (ref 0–50)
AMPHETAMINES UR QL SCN: NEGATIVE
ANION GAP SERPL CALCULATED.3IONS-SCNC: 8 MMOL/L (ref 3–14)
APPEARANCE UR: ABNORMAL
AST SERPL W P-5'-P-CCNC: 16 U/L (ref 0–45)
BACTERIA #/AREA URNS HPF: ABNORMAL /HPF
BARBITURATES UR QL: NEGATIVE
BASOPHILS # BLD AUTO: 0 10E9/L (ref 0–0.2)
BASOPHILS NFR BLD AUTO: 0.4 %
BENZODIAZ UR QL: NEGATIVE
BILIRUB SERPL-MCNC: 0.5 MG/DL (ref 0.2–1.3)
BILIRUB UR QL STRIP: NEGATIVE
BUN SERPL-MCNC: 6 MG/DL (ref 7–30)
CALCIUM SERPL-MCNC: 9.2 MG/DL (ref 8.5–10.1)
CANNABINOIDS UR QL SCN: POSITIVE
CHLORIDE SERPL-SCNC: 107 MMOL/L (ref 94–109)
CO2 SERPL-SCNC: 26 MMOL/L (ref 20–32)
COCAINE UR QL: NEGATIVE
COLOR UR AUTO: YELLOW
CREAT SERPL-MCNC: 0.66 MG/DL (ref 0.52–1.04)
CRP SERPL-MCNC: 17.4 MG/L (ref 0–8)
DIFFERENTIAL METHOD BLD: ABNORMAL
EOSINOPHIL # BLD AUTO: 0.3 10E9/L (ref 0–0.7)
EOSINOPHIL NFR BLD AUTO: 4 %
ERYTHROCYTE [DISTWIDTH] IN BLOOD BY AUTOMATED COUNT: 12.4 % (ref 10–15)
GFR SERPL CREATININE-BSD FRML MDRD: >90 ML/MIN/1.7M2
GLUCOSE SERPL-MCNC: 81 MG/DL (ref 70–99)
GLUCOSE UR STRIP-MCNC: NEGATIVE MG/DL
HCT VFR BLD AUTO: 43.1 % (ref 35–47)
HGB BLD-MCNC: 15 G/DL (ref 11.7–15.7)
HGB UR QL STRIP: ABNORMAL
IMM GRANULOCYTES # BLD: 0 10E9/L (ref 0–0.4)
IMM GRANULOCYTES NFR BLD: 0.3 %
KETONES UR STRIP-MCNC: NEGATIVE MG/DL
LEUKOCYTE ESTERASE UR QL STRIP: ABNORMAL
LIPASE SERPL-CCNC: 87 U/L (ref 73–393)
LYMPHOCYTES # BLD AUTO: 1.6 10E9/L (ref 0.8–5.3)
LYMPHOCYTES NFR BLD AUTO: 20.4 %
MCH RBC QN AUTO: 34.2 PG (ref 26.5–33)
MCHC RBC AUTO-ENTMCNC: 34.8 G/DL (ref 31.5–36.5)
MCV RBC AUTO: 98 FL (ref 78–100)
METHADONE UR QL SCN: NEGATIVE
MONOCYTES # BLD AUTO: 0.8 10E9/L (ref 0–1.3)
MONOCYTES NFR BLD AUTO: 10 %
MUCOUS THREADS #/AREA URNS LPF: PRESENT /LPF
NEUTROPHILS # BLD AUTO: 5.1 10E9/L (ref 1.6–8.3)
NEUTROPHILS NFR BLD AUTO: 64.9 %
NITRATE UR QL: NEGATIVE
NRBC # BLD AUTO: 0 10*3/UL
NRBC BLD AUTO-RTO: 0 /100
OPIATES UR QL SCN: NEGATIVE
PCP UR QL SCN: NEGATIVE
PH UR STRIP: 7 PH (ref 4.7–8)
PLATELET # BLD AUTO: 252 10E9/L (ref 150–450)
POTASSIUM SERPL-SCNC: 3.8 MMOL/L (ref 3.4–5.3)
PROT SERPL-MCNC: 8 G/DL (ref 6.8–8.8)
RBC # BLD AUTO: 4.39 10E12/L (ref 3.8–5.2)
RBC #/AREA URNS AUTO: >182 /HPF (ref 0–2)
SODIUM SERPL-SCNC: 141 MMOL/L (ref 133–144)
SOURCE: ABNORMAL
SP GR UR STRIP: 1.02 (ref 1–1.03)
UROBILINOGEN UR STRIP-MCNC: NORMAL MG/DL (ref 0–2)
WBC # BLD AUTO: 7.8 10E9/L (ref 4–11)
WBC #/AREA URNS AUTO: 116 /HPF (ref 0–2)
WBC CLUMPS #/AREA URNS HPF: PRESENT /HPF

## 2017-09-03 PROCEDURE — 96372 THER/PROPH/DIAG INJ SC/IM: CPT

## 2017-09-03 PROCEDURE — 99285 EMERGENCY DEPT VISIT HI MDM: CPT | Mod: 25

## 2017-09-03 PROCEDURE — 86140 C-REACTIVE PROTEIN: CPT

## 2017-09-03 PROCEDURE — 25000128 H RX IP 250 OP 636

## 2017-09-03 PROCEDURE — 99285 EMERGENCY DEPT VISIT HI MDM: CPT

## 2017-09-03 PROCEDURE — 25000132 ZZH RX MED GY IP 250 OP 250 PS 637

## 2017-09-03 PROCEDURE — 76705 ECHO EXAM OF ABDOMEN: CPT | Mod: TC

## 2017-09-03 PROCEDURE — 83690 ASSAY OF LIPASE: CPT

## 2017-09-03 PROCEDURE — 87086 URINE CULTURE/COLONY COUNT: CPT

## 2017-09-03 PROCEDURE — 36415 COLL VENOUS BLD VENIPUNCTURE: CPT

## 2017-09-03 PROCEDURE — 85025 COMPLETE CBC W/AUTO DIFF WBC: CPT

## 2017-09-03 PROCEDURE — 80053 COMPREHEN METABOLIC PANEL: CPT

## 2017-09-03 PROCEDURE — 80307 DRUG TEST PRSMV CHEM ANLYZR: CPT

## 2017-09-03 PROCEDURE — 81001 URINALYSIS AUTO W/SCOPE: CPT | Mod: 59

## 2017-09-03 RX ORDER — CYCLOBENZAPRINE HCL 10 MG
10 TABLET ORAL ONCE
Status: COMPLETED | OUTPATIENT
Start: 2017-09-03 | End: 2017-09-03

## 2017-09-03 RX ORDER — KETOROLAC TROMETHAMINE 30 MG/ML
60 INJECTION, SOLUTION INTRAMUSCULAR; INTRAVENOUS ONCE
Status: COMPLETED | OUTPATIENT
Start: 2017-09-03 | End: 2017-09-03

## 2017-09-03 RX ORDER — CYCLOBENZAPRINE HCL 10 MG
10 TABLET ORAL
Qty: 14 TABLET | Refills: 1 | Status: SHIPPED | OUTPATIENT
Start: 2017-09-03 | End: 2017-11-04

## 2017-09-03 RX ORDER — KETOROLAC TROMETHAMINE 10 MG/1
10 TABLET, FILM COATED ORAL EVERY 6 HOURS PRN
Qty: 20 TABLET | Refills: 0 | Status: SHIPPED | OUTPATIENT
Start: 2017-09-03 | End: 2017-11-04

## 2017-09-03 RX ADMIN — KETOROLAC TROMETHAMINE 60 MG: 60 INJECTION, SOLUTION INTRAMUSCULAR at 19:52

## 2017-09-03 RX ADMIN — CYCLOBENZAPRINE HYDROCHLORIDE 10 MG: 10 TABLET, FILM COATED ORAL at 21:07

## 2017-09-03 ASSESSMENT — ENCOUNTER SYMPTOMS
COLOR CHANGE: 0
DIFFICULTY URINATING: 0
HEADACHES: 0
EYE REDNESS: 0
CONFUSION: 0
ACTIVITY CHANGE: 1
NECK STIFFNESS: 0
SHORTNESS OF BREATH: 0
ARTHRALGIAS: 0
APPETITE CHANGE: 1
ABDOMINAL PAIN: 1
FEVER: 0

## 2017-09-03 NOTE — ED AVS SNAPSHOT
[facility]    [address] [address]    Phone:  [phone]                                       Reshma Martinez   MRN: [id number]    Department:  [facility]   Date of Visit:  9/3/2017           After Visit Summary Signature Page     I have received my discharge instructions, and my questions have been answered. I have discussed any challenges I see with this plan with the nurse or doctor.    ..........................................................................................................................................  Patient/Patient Representative Signature      ..........................................................................................................................................  Patient Representative Print Name and Relationship to Patient    ..................................................               ................................................  Date                                            Time    ..........................................................................................................................................  Reviewed by Signature/Title    ...................................................              ..............................................  Date                                                            Time

## 2017-09-03 NOTE — ED AVS SNAPSHOT
[facility]    [address] [address]    Phone:  [phone]                                       Reshma Martinez   MRN: [id number]    Department:  [facility]   Date of Visit:  9/3/2017           Patient Information     Date Of Birth          1995        Your diagnoses for this visit were:     RUQ abdominal pain        You were seen by Yaquelin Jane APRN FNP.      Follow-up Information     Follow up with PCP In 1 week.    Why:  if not improving or back to baseline        Follow up with [facility].    Specialty:  EMERGENCY MEDICINE    Why:  As needed, If symptoms worsen    Contact information:    [address] [address] [phone]    Additional information:    From [facility]: Take [address]. Turn left at [address]. Turn left at the first stoplight on [address]. At the first stop sign, take a right onto [address]. Take a left into the parking lot and continue through until you reach the North enterance of the building.       From [place]: Take [address]. Take the [address] ramp towards [place]. Turn left onto [address]. Take a slight right onto [address]. Turn left at the first stoplight on [address]. At the first stop sign, take a right onto [address]. Take a left into the parking lot and continue through until you reach the North enterance of the building.       From [place]: Take [address]. Take a right at [address]. At the first stop sign, take a right onto [address]. Take a left into the parking lot and continue through until you reach the North enterance of the building.         Discharge Instructions         See attached for home care  You may have passed a gall stone  Rest, stretch  Toradol and flexeril for pain  Decrease fat in diet  F/U with PCP if not improving or back to base line in 1 week  Return to ED/UC  with worsening symptoms.       Acute Pain, RUQ  Pain can be caused by many conditions that range from very minor to very serious. In some cases, though, pain comes and goes with no apparent cause.  We were not able to find the exact cause for your pain. At this time there is no sign of any serious illness causing your pain. More tests may be needed to determine the cause. In many cases, pain like this goes away by itself.  Home care  Take any medicines as prescribed. If another medicine was not prescribed for pain, you can take an over-the-counter pain medicine such as ibuprofen or acetaminophen. Use these as directed on the label.    Follow-up care  Follow up with your healthcare provider or our staff as directed.  When to seek medical advice  Call your healthcare provider for any of the following:    Pain changes in pattern    Pain doesn't lessen or gets worse    New symptoms appear    Fever of 100.4 F (38 C) or higher, or as directed by your healthcare provider  Date Last Reviewed: 7/26/2015 2000-2017 [employer]. [address] [place] [address]. All rights reserved. This information is not intended as a substitute for professional medical care. Always follow your healthcare professional's instructions.             Review of your medicines      START taking        Dose / Directions Last dose taken    cyclobenzaprine 10 MG tablet   Commonly known as:  FLEXERIL   Dose:  10 mg   Quantity:  14 tablet        Take 1 tablet (10 mg) by mouth nightly as needed for muscle spasms   Refills:  1        ketorolac 10 MG tablet   Commonly known as:  TORADOL   Dose:  10 mg   Quantity:  20 tablet        Take 1 tablet (10 mg) by mouth every 6 hours as needed for moderate pain   Refills:  0          Our records show that you are taking the medicines listed below. If these are incorrect, please call your family doctor or clinic.        Dose / Directions Last dose taken    ciprofloxacin 500 MG tablet    Commonly known as:  CIPRO   Dose:  500 mg   Quantity:  14 tablet        Take 1 tablet (500 mg) by mouth 2 times daily for 7 days   Refills:  0        ibuprofen 800 MG tablet   Commonly known as:  ADVIL/MOTRIN   Dose:  800 mg   Quantity:  60 tablet        Take 1 tablet (800 mg) by mouth every 8 hours as needed for moderate pain   Refills:  0                Prescriptions were sent or printed at these locations (2 Prescriptions)                   [facility] - [place], [place] - [address] AT [facility] [address], [facility] [place] [address]    Telephone:  [phone]   Fax:  [phone]   Hours:                  E-Prescribed (2 of 2)         ketorolac (TORADOL) 10 MG tablet               cyclobenzaprine (FLEXERIL) 10 MG tablet                Procedures and tests performed during your visit     Abdomen US, limited (RUQ only)    CBC with platelets differential    CRP inflammation    Comprehensive metabolic panel    Drug Screen Urine (Range)    Lipase    UA reflex to Microscopic and Culture    Urine Culture Aerobic Bacterial      Orders Needing Specimen Collection     None      Pending Results     Date and Time Order Name Status Description    9/3/2017 2048 Urine Culture Aerobic Bacterial In process     9/3/2017 1945 Abdomen US, limited (RUQ only) In process             Pending Culture Results     Date and Time Order Name Status Description    9/3/2017 2048 Urine Culture Aerobic Bacterial In process             Thank you for choosing [facility]       Thank you for choosing [facility] for your care. Our goal is always to provide you with excellent care. Hearing back from our patients is one way we can continue to improve our services. Please take a few minutes to complete the written survey that you may receive in the mail after you visit with us. Thank you!        [employer] Information     [facility] lets you send messages to your doctor, view your test results, renew your prescriptions, schedule  "appointments and more. To sign up, go to [email or web address] . Click on \"Log in\" on the left side of the screen, which will take you to the Welcome page. Then click on \"Sign up Now\" on the right side of the page.     You will be asked to enter the access code listed below, as well as some personal information. Please follow the directions to create your username and password.     Your access code is: [email or web address]  Expires: 10/10/2017  2:56 PM     Your access code will  in 90 days. If you need help or a new code, please call your [facility] clinic or [phone].        Care EveryWhere ID     This is your Care EveryWhere ID. This could be used by other organizations to access your [facility] medical records  [id number]        Equal Access to Services     ENRIQUE JAMES : Lazaro Abraham, harsh onofre, akanksha carpenter, connie huertas . So [facility] [phone].    ATENCIÓN: Si habla español, tiene a gramajo disposición servicios gratuitos de asistencia lingüística. Llame al [phone].    We comply with applicable federal civil rights laws and [place] laws. We do not discriminate on the basis of race, color, national origin, age, disability sex, sexual orientation or gender identity.            After Visit Summary       This is your record. Keep this with you and show to your community pharmacist(s) and doctor(s) at your next visit.                  "

## 2017-09-04 NOTE — ED NOTES
Pt up to provide clean catch urine sample. Pt reports she is at the end of her period. Pt encouraged to use wipes to clean as good as possible for clean catch.

## 2017-09-04 NOTE — ED NOTES
Pt ambulatory to room 11 of the ED. Pt was at home laying in bed around 1430 today when suddenly pt had stabbing feelings in her RUQ/right lower rib cage. More pain felt with inspiration. BM today. Urinating ok. Drinking water but pain with eating solids. Call light in reach. Pt gowned.

## 2017-09-04 NOTE — ED PROVIDER NOTES
"  History     Chief Complaint   Patient presents with     Shortness of Breath     pain with inspiration     HPI  Reshma Martinez is a 22 year old female who presents to ED via private car for evaluation of RUQ pain. Onset of sx 0230 this AM and progressed throughout the day. Rates pain 6 on 1-10 scale, \"takes my breath away\". Has not taken anything for the pain, reports decreased appetite, normal b/b, denies n/v, last meal 2 hours PTA. Gallbladder intact.     Allergies   Allergen Reactions     Codeine Phosphate Other (See Comments)     Vomiting (Tylenol #3)         No current facility-administered medications on file prior to encounter.   Current Outpatient Prescriptions on File Prior to Encounter:  ciprofloxacin (CIPRO) 500 MG tablet Take 1 tablet (500 mg) by mouth 2 times daily for 7 days   ibuprofen (ADVIL/MOTRIN) 800 MG tablet Take 1 tablet (800 mg) by mouth every 8 hours as needed for moderate pain     Patient Active Problem List   Diagnosis     Tobacco abuse     Depression     Attention deficit disorder     Nonpsychotic mental disorder     Multiple body piercings     Positive urine drug screen     Acute stress reaction     Need for HPV vaccination     Outbursts of anger     Need for vaccination     Status post vaginal delivery     Family planning     Vaginal spotting     Encounter for routine gynecological examination     Ecchymosis     Easy bruisability     Abdominal pain, generalized     Tobacco abuse     Chest pain     Alopecia     Ovarian mass     NO SHOW     Insufficient endocervical or transformation zone component in cervical specimen       Past Surgical History:   Procedure Laterality Date     DILATION AND CURETTAGE, HYSTEROSCOPY DIAGNOSTIC, COMBINED  4/11/2014    Procedure: DILATION AND CURETTAGE, HYSTEROSCOPY;  Surgeon: Horace Ledbetter MD;  Location: [facility] OR     ORIF hand  2012    right     removal of hardware hand  2012    right       Social History   Substance Use Topics     Smoking status: Current " "Every Day Smoker     Packs/day: 1.00     Years: 10.00     Types: Cigarettes     Smokeless tobacco: Never Used      Comment: 10 units per day, has not tried to quit, passive exposure     Alcohol use Yes      Comment: rarely       Most Recent Immunizations   Administered Date(s) Administered     DTAP (<7y) 08/06/1998     HIB 01/12/1995     HPVQuadrivalent 04/02/2014     HepB-Peds 08/06/1998     Influenza (IIV3) 09/24/2013     MMR 09/14/2005     OPV, trivalent, live 08/06/1998     Pneumococcal 23 valent 09/04/2013     TD (ADULT, 7+) 09/14/2005     TDAP Vaccine (Boostrix) 12/10/2013     Varicella 09/14/2005       BMI: Estimated body mass index is 25.06 kg/(m^2) as calculated from the following:    Height as of 6/27/16: 1.626 m (5' 4\").    Weight as of 6/27/16: 66.2 kg (146 lb).      Review of Systems   Constitutional: Positive for activity change and appetite change. Negative for fever.   HENT: Negative for congestion.    Eyes: Negative for redness.   Respiratory: Negative for shortness of breath.    Cardiovascular: Negative for chest pain.   Gastrointestinal: Positive for abdominal pain.        RUQ pain, see HPI   Genitourinary: Negative for difficulty urinating.   Musculoskeletal: Negative for arthralgias and neck stiffness.   Skin: Negative for color change.   Neurological: Negative for headaches.   Psychiatric/Behavioral: Negative for confusion.       Physical Exam   BP: 149/81  Pulse: 100  Temp: 97.9  F (36.6  C)  Resp: 22  SpO2: 96 %  Physical Exam   Constitutional: She is oriented to person, place, and time. No distress.   HENT:   Head: Normocephalic and atraumatic.   Mouth/Throat: No oropharyngeal exudate.   Eyes: Conjunctivae are normal.   Neck: Normal range of motion. Neck supple. No thyromegaly present.   Cardiovascular: Normal rate and regular rhythm.    Pulmonary/Chest: Effort normal. No respiratory distress.   Abdominal: Soft. There is no hepatosplenomegaly. There is tenderness in the right upper quadrant. " There is positive Akbar's sign. There is no rigidity, no rebound, no guarding and no CVA tenderness.       Musculoskeletal: Normal range of motion.   Neurological: She is alert and oriented to person, place, and time.   Skin: Skin is warm and dry. She is not diaphoretic.   Nursing note and vitals reviewed.      ED Course     Procedures        Labs Ordered and Resulted from Time of ED Arrival Up to the Time of Departure from the ED   CBC WITH PLATELETS DIFFERENTIAL - Abnormal; Notable for the following:        Result Value    MCH 34.2 (*)     All other components within normal limits   COMPREHENSIVE METABOLIC PANEL - Abnormal; Notable for the following:     Urea Nitrogen 6 (*)     All other components within normal limits   DRUG SCREEN URINE (RANGE) - Abnormal; Notable for the following:     Cannabinoids Qual Urine Positive (*)     All other components within normal limits   UA MACROSCOPIC WITH REFLEX TO MICRO AND CULTURE - Abnormal; Notable for the following:     Blood Urine Large (*)     Protein Albumin Urine 30 (*)     Leukocyte Esterase Urine Large (*)     RBC Urine >182 (*)     WBC Urine 116 (*)     WBC Clumps Present (*)     Bacteria Urine Few (*)     Mucous Urine Present (*)     All other components within normal limits   CRP INFLAMMATION - Abnormal; Notable for the following:     CRP Inflammation 17.4 (*)     All other components within normal limits   LIPASE     ABDOMINAL ULTRASOUND    HISTORY:  Right upper quadrant pain.    COMPARISON:  Today's study is compared to a prior abdomen and pelvis  CT which is dated August 29, 2017.    FINDINGS:  Visualized portions of the pancreas are unremarkable.  The  liver is normal in echotexture without intrahepatic biliary ductal  dilatation.  There is a 2.8 x 1.8 x 1.9 cm hyperechoic lesion in the  right lobe of the liver, possibly a hemangioma, but incompletely  assessed.  This was not definitely seen on a prior abdomen and pelvis  CT dated August 31, 2015.  The  gallbladder is unremarkable.  No  gallbladder wall thickening or pericholecystic fluid.  The common bile  duct measures 2 mm in diameter.  The right kidney is unremarkable  without hydronephrosis.  Visualized portions of the aorta and inferior  vena cava are unremarkable.    IMPRESSION:  1.  NO ACUTE FINDINGS.    2.  NONSPECIFIC 2.8 CM LESION IN THE LIVER, LIKELY A HEMANGIOMA.  CONSIDER FOLLOW UP MRI OF THE LIVER WITH AND WITHOUT CONTRAST FOR  FURTHER EVALUATION, AS THIS WAS NOT DEFINITELY SEEN IN THE PAST.    Exam Date: Sep 03, 2017 09:08:40 PM  Author: FRANK NAIDU    Assessments & Plan (with Medical Decision Making)   Pt presents with RUQ pain, sudden onset early this morning. She was seen her 5 days ago with abdominal pain, CT abdomen normal, she was dx with UTI and started on cipro x 7 days. She was also given zithromax 1 gm 2 days later for chlamydia. Labs obtained today with results as above. Gallbladder us no acute findings.   Discussed with pt that although I am not able to determine exact cause of pain, emergent causes ruled out. She may have passed a gall stone. She was given IM toradol and PO flexeril in the ED with improved sx.   Will d/c to home with [facility] discharge instructions and recommended f/u with PCP for continued care. Pt verbalizes understanding and agrees with plan.    I have reviewed the nursing notes.    I have reviewed the findings, diagnosis, plan and need for follow up with the patient.    Discharge Medication List as of 9/3/2017  9:04 PM      START taking these medications    Details   ketorolac (TORADOL) 10 MG tablet Take 1 tablet (10 mg) by mouth every 6 hours as needed for moderate pain, Disp-20 tablet, R-0, E-Prescribe      cyclobenzaprine (FLEXERIL) 10 MG tablet Take 1 tablet (10 mg) by mouth nightly as needed for muscle spasms, Disp-14 tablet, R-1, E-Prescribe             Final diagnoses:   RUQ abdominal pain     See attached for home care  You may have passed a gall stone  Rest,  stretch  Toradol and flexeril for pain  Decrease fat in diet  F/U with PCP if not improving or back to base line in 1 week  Return to ED/UC with worsening symptoms.       9/3/2017   [facility]     Yaquelin Jane APRN FNP  09/05/17 [id number]

## 2017-09-04 NOTE — DISCHARGE INSTRUCTIONS
See attached for home care  You may have passed a gall stone  Rest, stretch  Toradol and flexeril for pain  Decrease fat in diet  F/U with PCP if not improving or back to base line in 1 week  Return to ED/UC with worsening symptoms.       Acute Pain, RUQ  Pain can be caused by many conditions that range from very minor to very serious. In some cases, though, pain comes and goes with no apparent cause.  We were not able to find the exact cause for your pain. At this time there is no sign of any serious illness causing your pain. More tests may be needed to determine the cause. In many cases, pain like this goes away by itself.  Home care  Take any medicines as prescribed. If another medicine was not prescribed for pain, you can take an over-the-counter pain medicine such as ibuprofen or acetaminophen. Use these as directed on the label.    Follow-up care  Follow up with your healthcare provider or our staff as directed.  When to seek medical advice  Call your healthcare provider for any of the following:    Pain changes in pattern    Pain doesn't lessen or gets worse    New symptoms appear    Fever of 100.4 F (38 C) or higher, or as directed by your healthcare provider  Date Last Reviewed: 7/26/2015 2000-2017 [employer]. [address] [place] [address], [place] [address]. All rights reserved. This information is not intended as a substitute for professional medical care. Always follow your healthcare professional's instructions.

## 2017-09-05 LAB
BACTERIA SPEC CULT: ABNORMAL
SPECIMEN SOURCE: ABNORMAL

## 2017-09-11 ENCOUNTER — OFFICE VISIT (OUTPATIENT)
Dept: FAMILY MEDICINE | Facility: OTHER | Age: 22
End: 2017-09-11
Attending: FAMILY MEDICINE
Payer: COMMERCIAL

## 2017-09-11 ENCOUNTER — OFFICE VISIT (OUTPATIENT)
Dept: BEHAVIORAL HEALTH | Facility: OTHER | Age: 22
End: 2017-09-11
Attending: SOCIAL WORKER
Payer: COMMERCIAL

## 2017-09-11 VITALS
HEART RATE: 94 BPM | DIASTOLIC BLOOD PRESSURE: 70 MMHG | TEMPERATURE: 97.8 F | SYSTOLIC BLOOD PRESSURE: 130 MMHG | OXYGEN SATURATION: 98 % | BODY MASS INDEX: 29.18 KG/M2 | WEIGHT: 170 LBS | RESPIRATION RATE: 20 BRPM

## 2017-09-11 DIAGNOSIS — K76.9 LIVER LESION: ICD-10-CM

## 2017-09-11 DIAGNOSIS — F17.200 TOBACCO USE DISORDER: Primary | ICD-10-CM

## 2017-09-11 DIAGNOSIS — F33.9 EPISODE OF RECURRENT MAJOR DEPRESSIVE DISORDER, UNSPECIFIED DEPRESSION EPISODE SEVERITY (H): ICD-10-CM

## 2017-09-11 DIAGNOSIS — R69 DIAGNOSIS DEFERRED: Primary | ICD-10-CM

## 2017-09-11 DIAGNOSIS — A74.9 CHLAMYDIA INFECTION: ICD-10-CM

## 2017-09-11 DIAGNOSIS — F12.90 MARIJUANA USE: ICD-10-CM

## 2017-09-11 DIAGNOSIS — F10.10 ALCOHOL ABUSE: ICD-10-CM

## 2017-09-11 PROCEDURE — 99212 OFFICE O/P EST SF 10 MIN: CPT

## 2017-09-11 PROCEDURE — 99207 ZZC NO CHARGE LOS: CPT | Performed by: SOCIAL WORKER

## 2017-09-11 PROCEDURE — 99214 OFFICE O/P EST MOD 30 MIN: CPT | Performed by: FAMILY MEDICINE

## 2017-09-11 ASSESSMENT — ANXIETY QUESTIONNAIRES
2. NOT BEING ABLE TO STOP OR CONTROL WORRYING: NOT AT ALL
6. BECOMING EASILY ANNOYED OR IRRITABLE: NOT AT ALL
5. BEING SO RESTLESS THAT IT IS HARD TO SIT STILL: NOT AT ALL
1. FEELING NERVOUS, ANXIOUS, OR ON EDGE: NOT AT ALL
3. WORRYING TOO MUCH ABOUT DIFFERENT THINGS: SEVERAL DAYS
IF YOU CHECKED OFF ANY PROBLEMS ON THIS QUESTIONNAIRE, HOW DIFFICULT HAVE THESE PROBLEMS MADE IT FOR YOU TO DO YOUR WORK, TAKE CARE OF THINGS AT HOME, OR GET ALONG WITH OTHER PEOPLE: NOT DIFFICULT AT ALL
GAD7 TOTAL SCORE: 2
7. FEELING AFRAID AS IF SOMETHING AWFUL MIGHT HAPPEN: NOT AT ALL

## 2017-09-11 ASSESSMENT — PAIN SCALES - GENERAL: PAINLEVEL: NO PAIN (0)

## 2017-09-11 ASSESSMENT — PATIENT HEALTH QUESTIONNAIRE - PHQ9
5. POOR APPETITE OR OVEREATING: SEVERAL DAYS
SUM OF ALL RESPONSES TO PHQ QUESTIONS 1-9: 6

## 2017-09-11 NOTE — MR AVS SNAPSHOT
After Visit Summary   9/11/2017    Reshma Martinez    MRN: [id number]           Patient Information     Date Of Birth          1995        Visit Information        Provider Department      9/11/2017 3:00 PM Caroline Hamilton, [facility]        Today's Diagnoses     Diagnosis deferred    -  1       Follow-ups after your visit        Your next 10 appointments already scheduled     Sep 18, 2017  2:30 PM CDT   (Arrive by 2:15 PM)   [facility] Visit with MEGAN Rodriguez   [facility] [place] [facility]    [address] [place] [address] [phone]            Sep 18, 2017  2:30 PM CDT   (Arrive by 2:15 PM)   [facility] Visit with Caroline Hamilton [facility] [place] [facility]    [address]  [place] [address] [phone]              Future tests that were ordered for you today     Open Future Orders        Priority Expected Expires Ordered    Chlamydia trachomatis PCR Routine  3/11/2018 9/11/2017            Who to contact     If you have questions or need follow up information about today's clinic visit or your schedule please contact [facility] directly at [phone].  Normal or non-critical lab and imaging results will be communicated to you by [employer], letter or phone within 4 business days after the clinic has received the results. If you do not hear from us within 7 days, please contact the clinic through [employer] or phone. If you have a critical or abnormal lab result, we will notify you by phone as soon as possible.  Submit refill requests through [employer] or call your pharmacy and they will forward the refill request to us. Please allow 3 business days for your refill to be completed.          Additional Information About Your Visit        [employer] Information     [employer] lets you send messages to your doctor, view your test results, renew your prescriptions, schedule  "appointments and more. To sign up, go to [email or web address] . Click on \"Log in\" on the left side of the screen, which will take you to the Welcome page. Then click on \"Sign up Now\" on the right side of the page.     You will be asked to enter the access code listed below, as well as some personal information. Please follow the directions to create your username and password.     Your access code is: [email or web address]  Expires: 10/10/2017  2:56 PM     Your access code will  in 90 days. If you need help or a new code, please call your [facility] clinic or [phone].        Care EveryWhere ID     This is your Care EveryWhere ID. This could be used by other organizations to access your [facility] medical records  [id number]        Your Vitals Were     Last Period                   2017            Blood Pressure from Last 3 Encounters:   17 130/70   17 120/86   17 131/78    Weight from Last 3 Encounters:   17 170 lb (77.1 kg)   16 146 lb (66.2 kg)   16 139 lb (63 kg)              Today, you had the following     No orders found for display       Primary Care Provider    None       No address on file        Equal Access to Services     ENRIQUE JAMES : Hadii terrell ku bryo Sokhaiali, waaxda luqadaha, qaybta kaalmada adeegyada, connie huertas . So [facility] [phone].    ATENCIÓN: Si habla español, tiene a gramajo disposición servicios gratuitos de asistencia lingüística. Llame al [phone].    We comply with applicable federal civil rights laws and [place] laws. We do not discriminate on the basis of race, color, national origin, age, disability sex, sexual orientation or gender identity.            Thank you!     Thank you for choosing [facility]  for your care. Our goal is always to provide you with excellent care. Hearing back from our patients is one way we can continue to improve our services. Please take a few minutes to complete the " written survey that you may receive in the mail after your visit with us. Thank you!             Your Updated Medication List - Protect others around you: Learn how to safely use, store and throw away your medicines at www.disposemymeds.org.          This list is accurate as of: 9/11/17  3:52 PM.  Always use your most recent med list.                   Brand Name Dispense Instructions for use Diagnosis    cyclobenzaprine 10 MG tablet    FLEXERIL    14 tablet    Take 1 tablet (10 mg) by mouth nightly as needed for muscle spasms        ketorolac 10 MG tablet    TORADOL    20 tablet    Take 1 tablet (10 mg) by mouth every 6 hours as needed for moderate pain

## 2017-09-11 NOTE — PROGRESS NOTES
[facility]         Social Work Care Navigator Note      Patient: Reshma Martinez  Date: September 11, 2017  Preferred Name: Reshma    Previous PHQ-9:   PHQ-9 SCORE 4/29/2015 6/28/2016 9/11/2017   Total Score 6 - -   Total Score - 4 6     Previous OTILIO-7:   OTILIO-7 SCORE 6/28/2016 9/11/2017   Total Score 5 2     ALICIA LEVEL:  No flowsheet data found.    Preferred Contact: @[facility]([phone]@  Type of Contact Today: Face to Face in Clinic      Data: (subjective / Objective):  Patient Goals Areas:    Patient Stated Goals:    Recent ED/IP Admission or Discharge?   [place] ED Admission date: 09/03/2017    SW intern itroduced and explained integrated health model, brief therapy interventions and referral/ support services for ongoing therapy interventions.  Discussed [facility] services with patient and the patient's mother.  Patient interested and scheduled follow up appointment on 9-18.  Presenting Issue: Patient is interested in support regarding alcohol and marijuana use, housing, and managing mental health symptoms.     September 11, 2017 Anahi Landeros

## 2017-09-11 NOTE — PROGRESS NOTES
I have reviewed and agree with the information in this note as accurate and appropriate.    Cosigned by Caroline Hamilton, MSW, [facility] CenterSW, [facility] [place]

## 2017-09-11 NOTE — PROGRESS NOTES
"SUBJECTIVE:  Reshma is a 22 year old female who comes in today for ER follow up from 9/3 and 8/29.  Did have dyspnea and RUQ pain.  Treated first time for UTI, E Coli on culture, with Ciprofloxacin.  She was positive for chlamydia 8/29 and was treated with Doxycycline on 8/31 for 7 days.  Drug screen was also positive for marijuana.  CT abdomen notable for ovarian cyst, right, small.  U/S notable for 2.8 cm liver lesion, likely hemangioma, but MR advised.  Her abdominal pain has resolved.  She has completed her antibiotics.  No fever, nausea, vomiting or change in stools.  She does endorse alcohol use daily, 1-2 drinks or more.  Goes to the bar and drinks \"trash can\" mix of several liquors and red bull.  DWI 12/24/16.  Day treatment for marijuana.  Continued use throughout treatment.  Denies other illegal drug use.  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    She lives with a friend.  Has a 3 1/2 year old daughter who lives with daughter's biologic father.  No interaction with daughter.    Prior psychiatry evaluation with phi Cochran.  Prior medications - Seroquel, Trazodone, Depakote.    Here with mom who is concerned with her drinking as well.    Current Outpatient Prescriptions   Medication     ketorolac (TORADOL) 10 MG tablet     cyclobenzaprine (FLEXERIL) 10 MG tablet     No current facility-administered medications for this visit.         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     Social History Main Topics     Smoking status: Current Every Day Smoker     Packs/day: 1.00     Years: 10.00     Types: Cigarettes     Smokeless tobacco: Never Used      Comment: 10 units per day, has not tried to quit, passive exposure     Alcohol use Yes      Comment: rarely     Drug use: 7.00 per week     Special: Marijuana      Comment:  Marjuana     Sexual activity: Yes     Partners: Male     Birth control/ protection: Surgical     Other Topics Concern     Blood Transfusions Yes     Caffeine Concern Yes     16oz soda daily     Parent/Sibling W/ Cabg, Mi Or Angioplasty Before 65f 55m? No     Social History Narrative         ROS:  General: negative for, fever, chills  Skin: negative for, rash  Resp: No shortness of breath and No cough  CV: negative for and chest pain  GI: positive for as above, negative for, poor appetite, nausea, vomiting, abdominal pain, melena, hematochezia, constipation and diarrhea  : positive for as above, urinary tract infection and sexually transmitted disease, negative for, nocturia, dysuria, frequency, urgency and hematuria  Psychiatric: positive for as above, anxiety, depression, excessive alcohol consumption and illegal drug usage    OBJECTIVE:  Vitals:    09/11/17 1458   BP: 130/70   BP Location: Left arm   Patient Position: Chair   Cuff Size: Adult Large   Pulse: 94   Resp: 20   Temp: 97.8  F (36.6  C)   TempSrc: Tympanic   SpO2: 98%   Weight: 170 lb (77.1 kg)     GENERAL APPEARANCE: healthy, alert and no distress  RESP: lungs clear to auscultation - no rales, rhonchi  or wheezes  CV: regular rates and rhythm, normal S1 S2, no S3 or S4 and no murmur, click or rub -  ABDOMEN: bowel sounds normal, no palpable masses, no organomegaly and soft, mild tenderness across lower abdomen  MS: extremities normal- no gross deformities noted, no evidence of inflammation in joints, FROM in all extremities.  SKIN: no suspicious lesions or rashes  PSYCH: mentation appears normal and affect flat    ASSESSMENT/ORDERS:    ICD-10-CM    1. Tobacco use disorder F17.200    2. Chlamydia infection A74.9 Chlamydia trachomatis PCR   3. Liver lesion K76.9 MR Abdomen w/o & w Contrast     MR Abdomen w/o & w Contrast   4. Alcohol abuse F10.10    5. Marijuana use F12.10    6. Episode of recurrent major depressive disorder, unspecified depression episode severity (H) F33.9      PLAN:  Patient Instructions   MRI of liver to be done.  Condom use and routine std screening advised.  Repeat urine sample in 2 weeks to assure clearance of chlamydia.   Lab only appointment.  Met with Jennie naik for [facility] - behavioral health home.  Counseling advised.    Tobacco, alcohol, and marijuana cessation advised.        Caren Henderson

## 2017-09-11 NOTE — MR AVS SNAPSHOT
"              After Visit Summary   9/11/2017    Reshma Martinez    MRN: [id number]           Patient Information     Date Of Birth          1995        Visit Information        Provider Department      9/11/2017 3:00 PM Caren Reardon MD [facility]        Today's Diagnoses     Tobacco use disorder    -  1    Chlamydia infection        Liver lesion        Alcohol abuse        Marijuana use        Episode of recurrent major depressive disorder, unspecified depression episode severity (H)          Care Instructions    MRI of liver to be done.  Condom use and routine std screening advised.  Repeat urine sample in 2 weeks to assure clearance of chlamydia.   Lab only appointment.  Met with Jennie naik for [facility] behavioral health home.  Counseling advised.    Tobacco, alcohol, and marijuana cessation advised.            Follow-ups after your visit        Who to contact     If you have questions or need follow up information about today's clinic visit or your schedule please contact [facility] directly at [phone].  Normal or non-critical lab and imaging results will be communicated to you by MyChart, letter or phone within 4 business days after the clinic has received the results. If you do not hear from us within 7 days, please contact the clinic through MyChart or phone. If you have a critical or abnormal lab result, we will notify you by phone as soon as possible.  Submit refill requests through [employer] or call your pharmacy and they will forward the refill request to us. Please allow 3 business days for your refill to be completed.          Additional Information About Your Visit        MyChart Information     [employer] lets you send messages to your doctor, view your test results, renew your prescriptions, schedule appointments and more. To sign up, go to [email or web address] . Click on \"Log in\" on the left side of the screen, which will take you to the Welcome page. Then " "click on \"Sign up Now\" on the right side of the page.     You will be asked to enter the access code listed below, as well as some personal information. Please follow the directions to create your username and password.     Your access code is: [email or web address]  Expires: 10/10/2017  2:56 PM     Your access code will  in 90 days. If you need help or a new code, please call your [facility] clinic or [phone].        Care EveryWhere ID     This is your Care EveryWhere ID. This could be used by other organizations to access your [facility] medical records  [id number]        Your Vitals Were     Pulse Temperature Respirations Last Period Pulse Oximetry BMI (Body Mass Index)    94 97.8  F (36.6  C) (Tympanic) 20 2017 98% 29.18 kg/m2       Blood Pressure from Last 3 Encounters:   17 130/70   17 120/86   17 131/78    Weight from Last 3 Encounters:   17 170 lb (77.1 kg)   16 146 lb (66.2 kg)   16 139 lb (63 kg)               Primary Care Provider    None       No address on file        Equal Access to Services     ENRIQUE JAMES AH: Hadii terrell cobian Soflex, waaxda luqadaha, qaybta kaalmada adetejyada, connie hernandez. So [facility] [phone].    ATENCIÓN: Si habla español, tiene a gramajo disposición servicios gratuitos de asistencia lingüística. Llame al [phone].    We comply with applicable federal civil rights laws and [place] laws. We do not discriminate on the basis of race, color, national origin, age, disability sex, sexual orientation or gender identity.            Thank you!     Thank you for choosing [facility]  for your care. Our goal is always to provide you with excellent care. Hearing back from our patients is one way we can continue to improve our services. Please take a few minutes to complete the written survey that you may receive in the mail after your visit with us. Thank you!             Your Updated Medication List - " Protect others around you: Learn how to safely use, store and throw away your medicines at www.disposemymeds.org.          This list is accurate as of: 9/11/17  3:22 PM.  Always use your most recent med list.                   Brand Name Dispense Instructions for use Diagnosis    cyclobenzaprine 10 MG tablet    FLEXERIL    14 tablet    Take 1 tablet (10 mg) by mouth nightly as needed for muscle spasms        ketorolac 10 MG tablet    TORADOL    20 tablet    Take 1 tablet (10 mg) by mouth every 6 hours as needed for moderate pain

## 2017-09-11 NOTE — NURSING NOTE
"Chief Complaint   Patient presents with     ER F/U     stomach pain, liver lesion       Initial /70 (BP Location: Left arm, Patient Position: Chair, Cuff Size: Adult Large)  Pulse 94  Temp 97.8  F (36.6  C) (Tympanic)  Resp 20  Wt 170 lb (77.1 kg)  LMP 08/28/2017  SpO2 98%  BMI 29.18 kg/m2 Estimated body mass index is 29.18 kg/(m^2) as calculated from the following:    Height as of 6/27/16: 5' 4\" (1.626 m).    Weight as of this encounter: 170 lb (77.1 kg).  Medication Reconciliation: complete   Diane Anna LPN      "

## 2017-09-11 NOTE — PATIENT INSTRUCTIONS
MRI of liver to be done.  Condom use and routine std screening advised.  Repeat urine sample in 2 weeks to assure clearance of chlamydia.   Lab only appointment.  Met with Jennie naik for [facility] - behavioral health home.  Counseling advised.    Tobacco, alcohol, and marijuana cessation advised.

## 2017-09-12 ASSESSMENT — ANXIETY QUESTIONNAIRES: GAD7 TOTAL SCORE: 2

## 2017-09-14 ENCOUNTER — HOSPITAL ENCOUNTER (OUTPATIENT)
Dept: MRI IMAGING | Facility: HOSPITAL | Age: 22
Discharge: HOME | End: 2017-09-14
Attending: FAMILY MEDICINE | Admitting: FAMILY MEDICINE
Payer: COMMERCIAL

## 2017-09-14 PROCEDURE — A9585 GADOBUTROL INJECTION: HCPCS | Mod: TC | Performed by: RADIOLOGY

## 2017-09-14 PROCEDURE — 74183 MRI ABD W/O CNTR FLWD CNTR: CPT | Mod: TC

## 2017-09-14 RX ORDER — GADOBUTROL 604.72 MG/ML
10 INJECTION INTRAVENOUS ONCE
Status: COMPLETED | OUTPATIENT
Start: 2017-09-14 | End: 2017-09-14

## 2017-09-14 RX ADMIN — GADOBUTROL 10 ML: 604.72 INJECTION INTRAVENOUS at 12:55

## 2017-11-04 ENCOUNTER — HOSPITAL ENCOUNTER (EMERGENCY)
Facility: HOSPITAL | Age: 22
Discharge: HOME | End: 2017-11-04
Attending: INTERNAL MEDICINE | Admitting: INTERNAL MEDICINE
Payer: COMMERCIAL

## 2017-11-04 ENCOUNTER — APPOINTMENT (OUTPATIENT)
Dept: GENERAL RADIOLOGY | Facility: HOSPITAL | Age: 22
End: 2017-11-04
Attending: INTERNAL MEDICINE
Payer: COMMERCIAL

## 2017-11-04 VITALS — RESPIRATION RATE: 20 BRPM | HEART RATE: 105 BPM | TEMPERATURE: 97.2 F | OXYGEN SATURATION: 98 %

## 2017-11-04 DIAGNOSIS — S61.411A LACERATION OF RIGHT HAND WITHOUT FOREIGN BODY, INITIAL ENCOUNTER: ICD-10-CM

## 2017-11-04 PROCEDURE — 99283 EMERGENCY DEPT VISIT LOW MDM: CPT

## 2017-11-04 PROCEDURE — 99283 EMERGENCY DEPT VISIT LOW MDM: CPT | Performed by: INTERNAL MEDICINE

## 2017-11-04 PROCEDURE — 73130 X-RAY EXAM OF HAND: CPT | Mod: TC,RT

## 2017-11-04 ASSESSMENT — ENCOUNTER SYMPTOMS
FEVER: 0
NECK STIFFNESS: 0
EYE REDNESS: 0
CONFUSION: 0
ABDOMINAL PAIN: 0
SHORTNESS OF BREATH: 0
DIFFICULTY URINATING: 0
HEADACHES: 0
ARTHRALGIAS: 0
COLOR CHANGE: 0

## 2017-11-04 NOTE — DISCHARGE INSTRUCTIONS
* LACERATION (All Closures)  A laceration is a cut through the skin. This will usually require stitches (sutures) or staples if it is deep. Minor cuts may be treated with a tape closure ( Steri-Strips ) or Dermabond skin glue.       HOME CARE:  PAIN MEDICINE: You may use acetaminophen (Tylenol) 650-1000 mg every 6 hours or ibuprofen (Motrin, Advil) 600 mg every 6-8 hours with food to control pain, if you are able to take these medicines. [NOTE: If you have chronic liver or kidney disease or ever had a stomach ulcer or GI bleeding, talk with your doctor before using these medicines.]  EXTREMITY, FACE or TRUNK WOUNDS:    Keep the wound clean and dry. If a bandage was applied and it becomes wet or dirty, replace it. Otherwise, leave it in place for the first 24 hours.    If stitches or staples were used, clean the wound daily. Protect the wound from sunlight and tanning lamps.    After removing the bandage, wash the area with soap and water. Use a wet cotton swab (Q tip) to loosen and remove any blood or crust that forms.    After cleaning, apply a thin layer of Polysporin or Bacitracin ointment. This will keep the wound clean and make it easier to remove the stitches or staples. Reapply a fresh bandage.    You may remove the bandage to shower as usual after the first 24 hours, but do not soak the area in water (no swimming) until the stitches or staples are removed.    If Steri-Strips were used, keep the area clean and dry. If it becomes wet, blot it dry with a towel. It is okay to take a brief shower, but avoid scrubbing the area.    If Dermabond skin adhesive was used, do not scratch, rub or pick at the adhesive film. Do not place tape directly over the film. Do not apply liquid, ointment or creams to the wound while the film is in place. Do not clean the wound with peroxide and do not apply ointments. Avoid activities that cause heavy sweating until the film has fallen off. Protect the wound from prolonged  exposure to sunlight or tanning lamps. You may shower as usual but do not soak the wound in water (no baths or swimming). The film will fall off by itself in 5-10 days.  SCALP WOUNDS: During the first two days, you may carefully rinse your hair in the shower to remove blood, glass or dirt particles. After two days, you may shower and shampoo your hair normally. Do not soak your scalp in the tub or go swimming until the stitches or staples have been removed.  MOUTH WOUNDS: Eat soft foods to reduce pain. If the cut is inside of your mouth, clean by rinsing after each meal and at bedtime with a mixture of equal parts water and Hydrogen Peroxide (do not swallow!). Or, you can use a cotton swab to directly apply Hydrogen Peroxide onto the cut.  After the wound is done healing, use sunscreen over the area whenever exposed for the next 6 minths to avoid a darker scar.     FOLLOW UP: Most skin wounds heal within ten days. Mouth and facial wounds heal within five days. However, even with proper treatment, a wound infection may sometimes occur. Therefore, you should check the wound daily for signs of infection listed below.  Stitches should be removed from the face within five days; stitches and staples should be removed from other parts of the body within 7-10 days. Unless you are told to come back to the emergency room, you may have your doctor or urgent care remove the stitches. If dissolving stitches were used in the mouth, these will fall out or dissolve without the need for removal. If tape closures ( Steri-Strips ) were used, remove them yourself if they have not fallen off after 7 days. If Dermabond skin glue was used, the film will fall off by itself in 5-10 days.      GET PROMPT MEDICAL ATTENTION  if any of the following occur:    Increasing pain in the wound    Redness, swelling or pus coming from the wound    Fever over 101 F (38.3 C) oral    If stitches or staples come apart or fall out or if Steri-Strips fall  off before seven days    If the wound edges re-open    Bleeding not controlled by direct pressure    1562-7229 [employer]. [address] [place] [address], [place] [address]. All rights reserved. This information is not intended as a substitute for professional medical care. Always follow your healthcare professional's instructions.  This information has been modified by your health care provider with permission from the publisher.

## 2017-11-04 NOTE — ED AVS SNAPSHOT
[facility]    [address] [address]    Phone:  [phone]                                       Reshma Martinez   MRN: [id number]    Department:  [facility]   Date of Visit:  11/4/2017           After Visit Summary Signature Page     I have received my discharge instructions, and my questions have been answered. I have discussed any challenges I see with this plan with the nurse or doctor.    ..........................................................................................................................................  Patient/Patient Representative Signature      ..........................................................................................................................................  Patient Representative Print Name and Relationship to Patient    ..................................................               ................................................  Date                                            Time    ..........................................................................................................................................  Reviewed by Signature/Title    ...................................................              ..............................................  Date                                                            Time

## 2017-11-04 NOTE — ED AVS SNAPSHOT
[facility]    [address]    [place] [address]    Phone:  [phone]                                       Reshma Martinez   MRN: [id number]    Department:  [facility]   Date of Visit:  11/4/2017           Patient Information     Date Of Birth          1995        Your diagnoses for this visit were:     Laceration of right hand without foreign body, initial encounter        You were seen by Gerald Rosales MD.      Follow-up Information     Schedule an appointment as soon as possible for a visit with Caren Reardon MD.    Specialty:  Family Practice    Contact information:    [facility]  [address] [facility] [place] [address]  [phone]          Discharge Instructions          * LACERATION (All Closures)  A laceration is a cut through the skin. This will usually require stitches (sutures) or staples if it is deep. Minor cuts may be treated with a tape closure ( Steri-Strips ) or Dermabond skin glue.       HOME CARE:  PAIN MEDICINE: You may use acetaminophen (Tylenol) 650-1000 mg every 6 hours or ibuprofen (Motrin, Advil) 600 mg every 6-8 hours with food to control pain, if you are able to take these medicines. [NOTE: If you have chronic liver or kidney disease or ever had a stomach ulcer or GI bleeding, talk with your doctor before using these medicines.]  EXTREMITY, FACE or TRUNK WOUNDS:    Keep the wound clean and dry. If a bandage was applied and it becomes wet or dirty, replace it. Otherwise, leave it in place for the first 24 hours.    If stitches or staples were used, clean the wound daily. Protect the wound from sunlight and tanning lamps.    After removing the bandage, wash the area with soap and water. Use a wet cotton swab (Q tip) to loosen and remove any blood or crust that forms.    After cleaning, apply a thin layer of Polysporin or Bacitracin ointment. This will keep the wound clean and make it easier to remove the stitches or staples. Reapply  a fresh bandage.    You may remove the bandage to shower as usual after the first 24 hours, but do not soak the area in water (no swimming) until the stitches or staples are removed.    If Steri-Strips were used, keep the area clean and dry. If it becomes wet, blot it dry with a towel. It is okay to take a brief shower, but avoid scrubbing the area.    If Dermabond skin adhesive was used, do not scratch, rub or pick at the adhesive film. Do not place tape directly over the film. Do not apply liquid, ointment or creams to the wound while the film is in place. Do not clean the wound with peroxide and do not apply ointments. Avoid activities that cause heavy sweating until the film has fallen off. Protect the wound from prolonged exposure to sunlight or tanning lamps. You may shower as usual but do not soak the wound in water (no baths or swimming). The film will fall off by itself in 5-10 days.  SCALP WOUNDS: During the first two days, you may carefully rinse your hair in the shower to remove blood, glass or dirt particles. After two days, you may shower and shampoo your hair normally. Do not soak your scalp in the tub or go swimming until the stitches or staples have been removed.  MOUTH WOUNDS: Eat soft foods to reduce pain. If the cut is inside of your mouth, clean by rinsing after each meal and at bedtime with a mixture of equal parts water and Hydrogen Peroxide (do not swallow!). Or, you can use a cotton swab to directly apply Hydrogen Peroxide onto the cut.  After the wound is done healing, use sunscreen over the area whenever exposed for the next 6 minths to avoid a darker scar.     FOLLOW UP: Most skin wounds heal within ten days. Mouth and facial wounds heal within five days. However, even with proper treatment, a wound infection may sometimes occur. Therefore, you should check the wound daily for signs of infection listed below.  Stitches should be removed from the face within five days; stitches and staples  should be removed from other parts of the body within 7-10 days. Unless you are told to come back to the emergency room, you may have your doctor or urgent care remove the stitches. If dissolving stitches were used in the mouth, these will fall out or dissolve without the need for removal. If tape closures ( Steri-Strips ) were used, remove them yourself if they have not fallen off after 7 days. If Dermabond skin glue was used, the film will fall off by itself in 5-10 days.      GET PROMPT MEDICAL ATTENTION  if any of the following occur:    Increasing pain in the wound    Redness, swelling or pus coming from the wound    Fever over 101 F (38.3 C) oral    If stitches or staples come apart or fall out or if Steri-Strips fall off before seven days    If the wound edges re-open    Bleeding not controlled by direct pressure    2208-2074 [employer]. [address] [place] [address]. All rights reserved. This information is not intended as a substitute for professional medical care. Always follow your healthcare professional's instructions.  This information has been modified by your health care provider with permission from the publisher.         Review of your medicines      Notice     You have not been prescribed any medications.            Procedures and tests performed during your visit     Hand XR, G/E 3 views, right      Orders Needing Specimen Collection     None      Pending Results     Date and Time Order Name Status Description    11/4/2017 0300 Hand XR, G/E 3 views, right In process             Pending Culture Results     No orders found from 11/2/2017 to 11/5/2017.            Thank you for choosing [facility]       Thank you for choosing [facility] for your care. Our goal is always to provide you with excellent care. Hearing back from our patients is one way we can continue to improve our services. Please take a few minutes to complete the written survey that you may receive in the mail  "after you visit with us. Thank you!        [employer] Information     [employer] lets you send messages to your doctor, view your test results, renew your prescriptions, schedule appointments and more. To sign up, go to [email or web address] . Click on \"Log in\" on the left side of the screen, which will take you to the Welcome page. Then click on \"Sign up Now\" on the right side of the page.     You will be asked to enter the access code listed below, as well as some personal information. Please follow the directions to create your username and password.     Your access code is: [email or web address]  Expires: 2018  5:00 AM     Your access code will  in 90 days. If you need help or a new code, please call your [facility] clinic or [phone].        Care EveryWhere ID     This is your Care EveryWhere ID. This could be used by other organizations to access your [facility] medical records  [id number]        Equal Access to Services     PAULIE JAMES : Hadii aad ku hadasho Sokhaiali, waaxda luqadaha, qaybta kaalmada adetejyamichele, connie huertas . So [facility] [phone].    ATENCIÓN: Si habla español, tiene a gramajo disposición servicios gratuitos de asistencia lingüística. Llame al [phone].    We comply with applicable federal civil rights laws and [place] laws. We do not discriminate on the basis of race, color, national origin, age, disability, sex, sexual orientation, or gender identity.            After Visit Summary       This is your record. Keep this with you and show to your community pharmacist(s) and doctor(s) at your next visit.                  "

## 2017-11-05 NOTE — ED PROVIDER NOTES
History     Chief Complaint   Patient presents with     Laceration     punched a mirror and has right hand laceration. pt has ETOH on board.     Patient is a 22 year old female presenting with hand injury. The history is provided by the patient.   Hand Injury   Location:  Hand  Hand location:  R hand  Pain details:     Quality:  Aching    Severity:  Moderate    Onset quality:  Sudden    Timing:  Constant  Associated symptoms: no fever          Problem List:    Patient Active Problem List    Diagnosis Date Noted     Alcohol abuse 09/11/2017     Priority: Medium     Marijuana use 09/11/2017     Priority: Medium     Insufficient endocervical or transformation zone component in cervical specimen 07/05/2016     Priority: Medium     NO SHOW 10/26/2015     Priority: Medium     No showed Dr. Ludwig  No showed Dr. Ludwig 9/28/16  No showed Dr. Ludwig 7/12/17       Alopecia 09/16/2015     Priority: Medium     Ovarian mass 09/16/2015     Priority: Medium     Ecchymosis 08/25/2015     Priority: Medium     Easy bruisability 08/25/2015     Priority: Medium     Abdominal pain, generalized 08/25/2015     Priority: Medium     Tobacco abuse 08/25/2015     Priority: Medium     Chest pain 08/25/2015     Priority: Medium     Encounter for routine gynecological examination 04/29/2015     Priority: Medium     Problem list name updated by automated process. Provider to review       Vaginal spotting 08/27/2014     Priority: Medium     Family planning 03/12/2014     Priority: Medium     nexplanon       Status post vaginal delivery 02/21/2014     Priority: Medium     Oziel. Rt labial lac un 5#7       Acute stress reaction 09/04/2013     Priority: Medium     Need for HPV vaccination 09/04/2013     Priority: Medium     Needs Gardasil #3 postpartum       Outbursts of anger 09/04/2013     Priority: Medium     Need for vaccination 09/04/2013     Priority: Medium     Gardasil post partum  Problem list name updated by automated process. Provider to  review       Positive urine drug screen 08/08/2013     Priority: Medium     Confirmed + THC. Went into rehab and states is clean now 1/7/14       Multiple body piercings 07/31/2013     Priority: Medium     Tobacco abuse 10/02/2012     Priority: Medium     Attention deficit disorder 11/21/2011     Priority: Medium     Problem list name updated by automated process. 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     Tobacco abuse 10/02/2012       Past Surgical History:    Past Surgical History:   Procedure Laterality Date     DILATION AND CURETTAGE, HYSTEROSCOPY DIAGNOSTIC, COMBINED  4/11/2014    Procedure: DILATION AND CURETTAGE, HYSTEROSCOPY;  Surgeon: Horace Ledbetter MD;  Location[facility]     ORIF hand  2012    right     removal of hardware hand  2012    right       Family History:    Family History   Problem Relation Age of Onset     CANCER Maternal Grandfather      cause of death     Other - See Comments Maternal Grandfather      leukemia     Other - See Comments Other      psychiatric illness; family h/o     Other - See Comments Mother      Going through tests at [facility]     Other - See Comments Father      Heart Disease       Social History:  Marital Status:  Single [1]  Social History   Substance Use Topics     Smoking status: Current Every Day Smoker     Packs/day: 1.00     Years: 10.00     Types: Cigarettes     Smokeless tobacco: Never Used       Comment: 10 units per day, has not tried to quit, passive exposure     Alcohol use Yes      Comment: rarely        Medications:      No current outpatient prescriptions on file.      Review of Systems   Constitutional: Negative for fever.   HENT: Negative for congestion.    Eyes: Negative for redness.   Respiratory: Negative for shortness of breath.    Cardiovascular: Negative for chest pain.   Gastrointestinal: Negative for abdominal pain.   Genitourinary: Negative for difficulty urinating.   Musculoskeletal: Negative for arthralgias and neck stiffness.   Skin: Negative for color change.   Neurological: Negative for headaches.   Psychiatric/Behavioral: Negative for confusion.       Physical Exam   Pulse: 105  Temp: 97.2  F (36.2  C)  Resp: 20  SpO2: 98 %      Physical Exam   Constitutional: No distress.   HENT:   Head: Atraumatic.   Mouth/Throat: Oropharynx is clear and moist. No oropharyngeal exudate.   Eyes: Pupils are equal, round, and reactive to light. No scleral icterus.   Cardiovascular: Normal heart sounds and intact distal pulses.    Pulmonary/Chest: Breath sounds normal. No respiratory distress.   Abdominal: Soft. Bowel sounds are normal. There is no tenderness.   Musculoskeletal: She exhibits no edema.        Right hand: She exhibits tenderness, decreased capillary refill, laceration and swelling. She exhibits normal range of motion, no bony tenderness and no deformity. Normal sensation noted. Normal strength noted.        Hands:  Multiple superficial lacerations  Linear lac on third to 5th knuckles  Lateral aspect of 5th finger     Skin: Skin is warm. No rash noted. She is not diaphoretic.       ED Course     ED Course     Procedures                   Labs Ordered and Resulted from Time of ED Arrival Up to the Time of Departure from the ED - No data to display    Assessments & Plan (with Medical Decision Making)   Superficial R hand lacerat  She punched out a mirror  Denies any suicidal or homicidal  ideas or intent  Xray hand negative  Laceration stri-striped  Dc home  I have reviewed the nursing notes.    I have reviewed the findings, diagnosis, plan and need for follow up with the patient.      There are no discharge medications for this patient.      Final diagnoses:   Laceration of right hand without foreign body, initial encounter       11/4/2017   [facility]     Gerald Rosales MD  11/04/17 0132

## 2018-02-13 ENCOUNTER — HOSPITAL ENCOUNTER (EMERGENCY)
Facility: HOSPITAL | Age: 23
Discharge: HOME | End: 2018-02-13
Attending: PHYSICIAN ASSISTANT | Admitting: PHYSICIAN ASSISTANT
Payer: COMMERCIAL

## 2018-02-13 ENCOUNTER — APPOINTMENT (OUTPATIENT)
Dept: GENERAL RADIOLOGY | Facility: HOSPITAL | Age: 23
End: 2018-02-13
Attending: PHYSICIAN ASSISTANT
Payer: COMMERCIAL

## 2018-02-13 VITALS
DIASTOLIC BLOOD PRESSURE: 91 MMHG | SYSTOLIC BLOOD PRESSURE: 143 MMHG | RESPIRATION RATE: 12 BRPM | HEIGHT: 64 IN | OXYGEN SATURATION: 97 % | TEMPERATURE: 98 F

## 2018-02-13 DIAGNOSIS — S62.202A CLOSED NONDISPLACED FRACTURE OF FIRST METACARPAL BONE OF LEFT HAND, UNSPECIFIED PORTION OF METACARPAL, INITIAL ENCOUNTER: ICD-10-CM

## 2018-02-13 PROCEDURE — G0463 HOSPITAL OUTPT CLINIC VISIT: HCPCS

## 2018-02-13 PROCEDURE — 99213 OFFICE O/P EST LOW 20 MIN: CPT | Performed by: PHYSICIAN ASSISTANT

## 2018-02-13 PROCEDURE — 73110 X-RAY EXAM OF WRIST: CPT | Mod: TC,LT

## 2018-02-13 ASSESSMENT — ENCOUNTER SYMPTOMS
CONSTITUTIONAL NEGATIVE: 1
MYALGIAS: 1
NEUROLOGICAL NEGATIVE: 1
CARDIOVASCULAR NEGATIVE: 1
PSYCHIATRIC NEGATIVE: 1

## 2018-02-13 NOTE — ED AVS SNAPSHOT
[facility]    [address] [address]    Phone:  [phone]                                       Reshma Martinez   MRN: [id number]    Department:  [facility]   Date of Visit:  2/13/2018           After Visit Summary Signature Page     I have received my discharge instructions, and my questions have been answered. I have discussed any challenges I see with this plan with the nurse or doctor.    ..........................................................................................................................................  Patient/Patient Representative Signature      ..........................................................................................................................................  Patient Representative Print Name and Relationship to Patient    ..................................................               ................................................  Date                                            Time    ..........................................................................................................................................  Reviewed by Signature/Title    ...................................................              ..............................................  Date                                                            Time

## 2018-02-13 NOTE — ED AVS SNAPSHOT
[facility]    [address]    [place] [address]    Phone:  [phone]                                       Reshma Martinez   MRN: [id number]    Department[facility]  [facility]   Date of Visit:  2/13/2018           Patient Information     Date Of Birth          1995        Your diagnoses for this visit were:     Closed nondisplaced fracture of first metacarpal bone of left hand, unspecified portion of metacarpal, initial encounter        You were seen by Beverly Franklin PA.      Follow-up Information     Follow up with Caren Reardon MD.    Specialty:  Family Practice    Why:  If symptoms worsen, prior to your Orthopedic Consult evaluation    Contact information:    [address] [place] [address]  [phone]          Follow up with [facility].    Specialty:  EMERGENCY MEDICINE    Why:  If further concerns develop    Contact information:    [address]  [place] [address] [phone]    Additional information:    From [facility]: Take [address]. Turn left at [address]. Turn left at the first stoplight on [address] [place] [address]. At the first stop sign, take a right onto [facility]. Take a left into the parking lot and continue through until you reach the North enterance of the building.       From [place]: Take [address]. Take the [address] ramp towards [place]. Turn left onto [address]. Take a slight right onto [address]. Turn left at the first stoplight on [address]. At the first stop sign, take a right onto [address]. Take a left into the parking lot and continue through until you reach the North enterance of the building.       From [place]: Take [address]. Take a right at [address] [facility] [address]. At the first stop sign, take a right onto [address]. Take a left into the parking lot and continue through until you reach the North enterance of the building.      "  Discharge References/Attachments     HAND FRACTURE, CLOSED (ADULT) (ENGLISH)    SPLINT CARE, DISCHARGE INSTRUCTIONS (ENGLISH)      ED Discharge Orders     ORTHOPEDICS ADULT REFERRAL       Your provider has referred you to: {ORTHOPEDICS ADULT     Please be aware that coverage of these services is subject to the terms and limitations of your health insurance plan.  Call member services at your health plan with any benefit or coverage questions.      Please bring the following to your appointment:    >>   Any x-rays, CTs or MRIs which have been performed.  Contact the facility where they were done to arrange for  prior to your scheduled appointment.    >>   List of current medications   >>   This referral request   >>   Any documents/labs given to you for this referral                     Review of your medicines      Notice     You have not been prescribed any medications.            Procedures and tests performed during your visit     Wrist XR, G/E 3 views, left      Orders Needing Specimen Collection     None      Pending Results     Date and Time Order Name Status Description    2/13/2018 1809 Wrist XR, G/E 3 views, left In process             Pending Culture Results     No orders found from 2/11/2018 to 2/14/2018.            Thank you for choosing [facility]       Thank you for choosing [facility] for your care. Our goal is always to provide you with excellent care. Hearing back from our patients is one way we can continue to improve our services. Please take a few minutes to complete the written survey that you may receive in the mail after you visit with us. Thank you!        [employer] Information     [employer] lets you send messages to your doctor, view your test results, renew your prescriptions, schedule appointments and more. To sign up, go to [email or web address] . Click on \"Log in\" on the left side of the screen, which will take you to the Welcome page. Then click on \"Sign up Now\" on the right side of " the page.     You will be asked to enter the access code listed below, as well as some personal information. Please follow the directions to create your username and password.     Your access code is: [email or web address]  Expires: 2018  6:42 PM     Your access code will  in 90 days. If you need help or a new code, please call your [facility] clinic or [phone].        Care EveryWhere ID     This is your Care EveryWhere ID. This could be used by other organizations to access your [facility] medical records  [id number]        Equal Access to Services     [facility] Hospital[facility] : Hadyvon Abraham, harsh onofre, akanksha carpenter, connie huertas . So [facility] [phone].    ATENCIÓN: Si habla español, tiene a gramajo disposición servicios gratuitos de asistencia lingüística. Llame al [phone].    We comply with applicable federal civil rights laws and [place] laws. We do not discriminate on the basis of race, color, national origin, age, disability, sex, sexual orientation, or gender identity.            After Visit Summary       This is your record. Keep this with you and show to your community pharmacist(s) and doctor(s) at your next visit.

## 2018-02-14 NOTE — ED PROVIDER NOTES
"  History     Chief Complaint   Patient presents with     Wrist Pain     Pt states she \"fell going up the stairs on Friday\". States left wrist continues to hurt. Pt stated hit head, no LOC, denies any neck pain.     The history is provided by the patient. No  was used.     Rsehma Martinez is a 23 year old female who     Problem List:    Patient Active Problem List    Diagnosis Date Noted     Alcohol abuse 09/11/2017     Priority: Medium     Marijuana use 09/11/2017     Priority: Medium     Insufficient endocervical or transformation zone component in cervical specimen 07/05/2016     Priority: Medium     NO SHOW 10/26/2015     Priority: Medium     No showed Dr. Ludwig  No showed Dr. Ludwig 9/28/16  No showed Dr. Ludwig 7/12/17       Alopecia 09/16/2015     Priority: Medium     Ovarian mass 09/16/2015     Priority: Medium     Ecchymosis 08/25/2015     Priority: Medium     Easy bruisability 08/25/2015     Priority: Medium     Abdominal pain, generalized 08/25/2015     Priority: Medium     Tobacco abuse 08/25/2015     Priority: Medium     Chest pain 08/25/2015     Priority: Medium     Encounter for routine gynecological examination 04/29/2015     Priority: Medium     Problem list name updated by automated process. Provider to review       Vaginal spotting 08/27/2014     Priority: Medium     Family planning 03/12/2014     Priority: Medium     nexplanon       Status post vaginal delivery 02/21/2014     Priority: Medium     Oziel. Rt labial lac un 5#7       Acute stress reaction 09/04/2013     Priority: Medium     Need for HPV vaccination 09/04/2013     Priority: Medium     Needs Gardasil #3 postpartum       Outbursts of anger 09/04/2013     Priority: Medium     Need for vaccination 09/04/2013     Priority: Medium     Gardasil post partum  Problem list name updated by automated process. Provider to review       Positive urine drug screen 08/08/2013     Priority: Medium     Confirmed + THC. Went into rehab " and states is clean now 1/7/14       Multiple body piercings 07/31/2013     Priority: Medium     Tobacco abuse 10/02/2012     Priority: Medium     Attention deficit disorder 11/21/2011     Priority: Medium     Problem list name updated by automated process. 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     Tobacco abuse 10/02/2012       Past Surgical History:    Past Surgical History:   Procedure Laterality Date     DILATION AND CURETTAGE, HYSTEROSCOPY DIAGNOSTIC, COMBINED  4/11/2014    Procedure: DILATION AND CURETTAGE, HYSTEROSCOPY;  Surgeon: Horaec Ledbetter MD;  Location: [facility] OR     ORIF hand  2012    right     removal of hardware hand  2012    right       Family History:    Family History   Problem Relation Age of Onset     CANCER Maternal Grandfather      cause of death     Other - See Comments Maternal Grandfather      leukemia     Other - See Comments Other      psychiatric illness; family h/o     Other - See Comments Mother      Going through tests at [facility]     Other - See Comments Father      Heart Disease       Social History:  Marital Status:  Single [1]  Social History   Substance Use Topics     Smoking status: Current Every Day Smoker     Packs/day: 1.00     Years: 10.00     Types: Cigarettes     Smokeless tobacco: Never Used      Comment: 10 units per day, has not tried to quit, passive exposure     Alcohol use Yes      Comment: rarely     "    Medications:      No current outpatient prescriptions on file.      Review of Systems   Constitutional: Negative.    HENT: Negative.    Cardiovascular: Negative.    Musculoskeletal: Positive for myalgias.   Neurological: Negative.    Psychiatric/Behavioral: Negative.        Physical Exam   BP: 143/91  Heart Rate: 99  Temp: 98  F (36.7  C)  Resp: 12  Height: 162.6 cm (5' 4\")  SpO2: 97 %      Physical Exam   Constitutional: She is oriented to person, place, and time. She appears well-developed and well-nourished. No distress.   Cardiovascular: Normal rate.    Pulmonary/Chest: Effort normal.   Musculoskeletal:   Left wrist: moderate edema/ecchymosis and moderate along 1st MC.  No snuff box TTP.  No erythema. M/n/v intact. 3/5 strength due to pain.   Neurological: She is alert and oriented to person, place, and time.   Skin: She is not diaphoretic.   Psychiatric: She has a normal mood and affect.   Nursing note and vitals reviewed.      ED Course     ED Course     Procedures    Left wrist xray: proximal end of the 1st MC non-displaced fx,  Pending official rad resulst      wrist brace and sling applied. Pt tolerated well    Assessments & Plan (with Medical Decision Making)     I have reviewed the nursing notes.    I have reviewed the findings, diagnosis, plan and need for follow up with the patient.      Final diagnoses:   Closed nondisplaced fracture of first metacarpal bone of left hand, unspecified portion of metacarpal, initial encounter         Wear sling and splint at all times  Patient verbally educated and given appropriate education sheets for the diagnoses and has no questions.  Take motrin as directed on the bottle as needed.  Follow up with your Primary Care provider if symptoms increase prior to your Orthopedic consultation.  if concerns develop, return to the ER  Beverly Moran  Physician Assistant  2/13/2018  7:19 PM  URGENT CARE CLINIC      2/13/2018   [facility]     Rigoberto, " PEGGY Paul  02/13/18 1928

## 2018-02-14 NOTE — ED NOTES
Patient presents with discoloration of Lt wrist/hand.  Patient states she fell up the stairs on Friday.  Patient states it hurts to use or move it.

## 2018-02-23 ENCOUNTER — OFFICE VISIT (OUTPATIENT)
Dept: ORTHOPEDICS | Facility: OTHER | Age: 23
End: 2018-02-23
Attending: PHYSICIAN ASSISTANT
Payer: COMMERCIAL

## 2018-02-23 VITALS
WEIGHT: 170 LBS | TEMPERATURE: 98.8 F | HEIGHT: 64 IN | BODY MASS INDEX: 29.02 KG/M2 | DIASTOLIC BLOOD PRESSURE: 84 MMHG | HEART RATE: 94 BPM | SYSTOLIC BLOOD PRESSURE: 110 MMHG | OXYGEN SATURATION: 98 %

## 2018-02-23 DIAGNOSIS — Z72.0 TOBACCO ABUSE: Primary | ICD-10-CM

## 2018-02-23 DIAGNOSIS — S62.202A CLOSED NONDISPLACED FRACTURE OF FIRST METACARPAL BONE OF LEFT HAND, UNSPECIFIED PORTION OF METACARPAL, INITIAL ENCOUNTER: ICD-10-CM

## 2018-02-23 PROCEDURE — 29075 APPL CST ELBW FNGR SHORT ARM: CPT | Performed by: ORTHOPAEDIC SURGERY

## 2018-02-23 PROCEDURE — 29075 APPL CST ELBW FNGR SHORT ARM: CPT

## 2018-02-23 PROCEDURE — 99203 OFFICE O/P NEW LOW 30 MIN: CPT | Mod: 25 | Performed by: ORTHOPAEDIC SURGERY

## 2018-02-23 PROCEDURE — G0463 HOSPITAL OUTPT CLINIC VISIT: HCPCS | Mod: 25

## 2018-02-23 ASSESSMENT — PAIN SCALES - GENERAL: PAINLEVEL: MODERATE PAIN (5)

## 2018-02-23 NOTE — NURSING NOTE
"Chief Complaint   Patient presents with     Musculoskeletal Problem     NPT Left Hand fracture       Initial /84  Pulse 94  Temp 98.8  F (37.1  C) (Tympanic)  Ht 5' 4\" (1.626 m)  Wt 170 lb (77.1 kg)  SpO2 98%  BMI 29.18 kg/m2 Estimated body mass index is 29.18 kg/(m^2) as calculated from the following:    Height as of this encounter: 5' 4\" (1.626 m).    Weight as of this encounter: 170 lb (77.1 kg).  Medication Reconciliation: complete   Pearl Garcia      "

## 2018-02-23 NOTE — MR AVS SNAPSHOT
"              After Visit Summary   2/23/2018    Reshma Martinez    MRN: [id number]           Patient Information     Date Of Birth          1995        Visit Information        Provider Department      2/23/2018 3:40 PM Cal Dozier MD  ORTHOPEDICS        Today's Diagnoses     Tobacco abuse    -  1    Closed nondisplaced fracture of first metacarpal bone of left hand, unspecified portion of metacarpal, initial encounter           Follow-ups after your visit        Follow-up notes from your care team     Return in about 4 weeks (around 3/23/2018).      Your next 10 appointments already scheduled     Mar 23, 2018  3:20 PM CDT   (Arrive by 3:00 PM)   Return Visit with Cal Dozier MD    ORTHOPEDICS ([facility] )    [address] [facility] [address] [phone]              Who to contact     If you have questions or need follow up information about today's clinic visit or your schedule please contact  ORTHOPEDICS directly at [phone].  Normal or non-critical lab and imaging results will be communicated to you by MyChart, letter or phone within 4 business days after the clinic has received the results. If you do not hear from us within 7 days, please contact the clinic through OFERTALDIAhart or phone. If you have a critical or abnormal lab result, we will notify you by phone as soon as possible.  Submit refill requests through [employer] or call your pharmacy and they will forward the refill request to us. Please allow 3 business days for your refill to be completed.          Additional Information About Your Visit        OFERTALDIAharMediSapiens Information     [employer] lets you send messages to your doctor, view your test results, renew your prescriptions, schedule appointments and more. To sign up, go to [email or web address] . Click on \"Log in\" on the left side of the screen, which will take you to the Welcome page. Then click on \"Sign up Now\" on the right side of the page.     You " "will be asked to enter the access code listed below, as well as some personal information. Please follow the directions to create your username and password.     Your access code is: [email or web address]  Expires: 2018  6:42 PM     Your access code will  in 90 days. If you need help or a new code, please call your [facility] clinic or [phone].        Care EveryWhere ID     This is your Care EveryWhere ID. This could be used by other organizations to access your [facility] medical records  [id number]        Your Vitals Were     Pulse Temperature Height Pulse Oximetry BMI (Body Mass Index)       94 98.8  F (37.1  C) (Tympanic) 5' 4\" (1.626 m) 98% 29.18 kg/m2        Blood Pressure from Last 3 Encounters:   18 110/84   18 143/91   17 130/70    Weight from Last 3 Encounters:   18 170 lb (77.1 kg)   17 170 lb (77.1 kg)   16 146 lb (66.2 kg)              Today, you had the following     No orders found for display       Primary Care Provider Office Phone # Fax #    Caren Reardon [phone]       [address] [place] [address]        Equal Access to Services     [facility] : Hadii terrell ku hadasho Soomaali, waaxda luqadaha, qaybta kaalmada adeegyamichele, connie hernandez. So [facility] [phone].    ATENCIÓN: Si habla español, tiene a gramajo disposición servicios gratuitos de asistencia lingüística. Llame al [phone].    We comply with applicable federal civil rights laws and [place] laws. We do not discriminate on the basis of race, color, national origin, age, disability, sex, sexual orientation, or gender identity.            Thank you!     Thank you for choosing  ORTHOPEDICS  for your care. Our goal is always to provide you with excellent care. Hearing back from our patients is one way we can continue to improve our services. Please take a few minutes to complete the written survey that you may receive in the mail after your " visit with us. Thank you!             Your Updated Medication List - Protect others around you: Learn how to safely use, store and throw away your medicines at www.disposemymeds.org.      Notice  As of 2/23/2018  4:29 PM    You have not been prescribed any medications.

## 2018-02-23 NOTE — PROGRESS NOTES
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    History of Present Illness/Injury: She fell going upstairs on the above date injuring her left thumb.  X-rays in the ED on date 2/13/18 revealed a undisplaced extra-articular 1st metacarpal base fracture.  She was splinted and referred here.  She reports the splint is uncomfortable.  She denies numbness in the thumb.    A review of the patient's complete medical/family/social  history, medications, allergies and a two (neurological and musculoskeletal) system review of systems are noncontributory for the presenting problem.  She has a history of a fracture in the opposite hand in the past.  She smokes but is not interested in quitting at this time.    Examination: Healthy-appearing female with appropriate mood and affect.  Vital signs stable and afebrile.  Head normocephalic and atraumatic.  Respiratory efforts unlabored.  Upon splint removal the right thumb showed no deformity.  There was subtle ecchymosis at the base of the thumb.  There was tenderness palpation in the proximal 1st metacarpal region.  The neurovascular status of the thumb is intact.    X-ray; plain films of the left hand taken on 2/3/18 show an undisplaced extra-articular 1st metacarpal base fracture    Impression:   #1.  Right 1st metacarpal fracture  #2.  Tobacco abuse    Plan: She was placed in a short arm thumb spica fiberglass cast.  She was given a brochure on cast care and was told to keep the cast clean and dry.  She'll return in 4 weeks for x-ray out of cast.

## 2018-03-08 DIAGNOSIS — S62.309A METACARPAL BONE FRACTURE: Primary | ICD-10-CM

## 2018-03-23 ENCOUNTER — OFFICE VISIT (OUTPATIENT)
Dept: ORTHOPEDICS | Facility: OTHER | Age: 23
End: 2018-03-23
Attending: ORTHOPAEDIC SURGERY
Payer: COMMERCIAL

## 2018-03-23 ENCOUNTER — RADIANT APPOINTMENT (OUTPATIENT)
Dept: GENERAL RADIOLOGY | Facility: OTHER | Age: 23
End: 2018-03-23
Attending: ORTHOPAEDIC SURGERY
Payer: COMMERCIAL

## 2018-03-23 VITALS
TEMPERATURE: 97.6 F | SYSTOLIC BLOOD PRESSURE: 124 MMHG | DIASTOLIC BLOOD PRESSURE: 80 MMHG | OXYGEN SATURATION: 99 % | HEIGHT: 64 IN | HEART RATE: 83 BPM | WEIGHT: 170 LBS | BODY MASS INDEX: 29.02 KG/M2

## 2018-03-23 DIAGNOSIS — S62.235D OTHER CLOSED NONDISPLACED FRACTURE OF BASE OF FIRST METACARPAL BONE OF LEFT HAND WITH ROUTINE HEALING, SUBSEQUENT ENCOUNTER: Primary | ICD-10-CM

## 2018-03-23 DIAGNOSIS — Z72.0 TOBACCO ABUSE: ICD-10-CM

## 2018-03-23 DIAGNOSIS — S62.309A METACARPAL BONE FRACTURE: ICD-10-CM

## 2018-03-23 PROCEDURE — G0463 HOSPITAL OUTPT CLINIC VISIT: HCPCS

## 2018-03-23 PROCEDURE — 73130 X-RAY EXAM OF HAND: CPT | Mod: TC,LT

## 2018-03-23 PROCEDURE — 99213 OFFICE O/P EST LOW 20 MIN: CPT | Performed by: ORTHOPAEDIC SURGERY

## 2018-03-23 ASSESSMENT — PAIN SCALES - GENERAL: PAINLEVEL: MODERATE PAIN (4)

## 2018-03-23 NOTE — NURSING NOTE
"Chief Complaint   Patient presents with     RECHECK     Left hand/ cast off/ xrays       Initial /80  Pulse 83  Temp 97.6  F (36.4  C) (Tympanic)  Ht 5' 4\" (1.626 m)  Wt 170 lb (77.1 kg)  SpO2 99%  BMI 29.18 kg/m2 Estimated body mass index is 29.18 kg/(m^2) as calculated from the following:    Height as of this encounter: 5' 4\" (1.626 m).    Weight as of this encounter: 170 lb (77.1 kg).  Medication Reconciliation: complete   Pearl Garcia      "

## 2018-03-23 NOTE — PROGRESS NOTES
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     History of Present Illness/Injury: She fell going upstairs on the above date injuring her left thumb.  X-rays in the ED on date 2/13/18 revealed a undisplaced extra-articular 1st metacarpal base fracture.  She was splinted and referred here. She presented on 2/23/18 and was placed in a fiberglass cast.    Subjective: The cast was removed.  Without the cast she said her thumb was \"sore\".    Nothing is changed with respect to her musculoskeletal or neurologic review of systems since seen last.  She still smokes and is not interested in quitting    Objective: Healthy-appearing female with appropriate mood and affect. The skin under where the cast had been is extremely dry and flaky.  The thumb had no deformity or swelling.  There was tenderness at the fracture site.    X-ray; plain films left thumb taken today show the fracture be healing in acceptable alignment    Impression:   #1. Healing 1st metacarpal fracture left hand  #2. Tobacco abuse    Plan: She was placed in a thumb spica brace. She is to wean herself out of the brace over the next 10 days and do ROM exercises.  She will return in a month for a final check.  An x-ray will not be necessary.  "

## 2018-03-23 NOTE — MR AVS SNAPSHOT
"              After Visit Summary   3/23/2018    Reshma Martinez    MRN: [id number]           Patient Information     Date Of Birth          1995        Visit Information        Provider Department      3/23/2018 3:20 PM Cal Dozier MD  ORTHOPEDICS        Today's Diagnoses     Other closed nondisplaced fracture of base of first metacarpal bone of left hand with routine healing, subsequent encounter    -  1    Tobacco abuse           Follow-ups after your visit        Follow-up notes from your care team     Return in about 1 month (around 4/23/2018).      Your next 10 appointments already scheduled     Apr 25, 2018  2:20 PM CDT   (Arrive by 2:00 PM)   Return Visit with Cal Dozier MD    ORTHOPEDICS ([facility] )    [address] [facility] [address] [phone]              Who to contact     If you have questions or need follow up information about today's clinic visit or your schedule please contact  ORTHOPEDICS directly at [phone].  Normal or non-critical lab and imaging results will be communicated to you by Wanamakerhart, letter or phone within 4 business days after the clinic has received the results. If you do not hear from us within 7 days, please contact the clinic through [employer] or phone. If you have a critical or abnormal lab result, we will notify you by phone as soon as possible.  Submit refill requests through [employer] or call your pharmacy and they will forward the refill request to us. Please allow 3 business days for your refill to be completed.          Additional Information About Your Visit        WanamakerharWellbeats Information     [employer] lets you send messages to your doctor, view your test results, renew your prescriptions, schedule appointments and more. To sign up, go to [email or web address] . Click on \"Log in\" on the left side of the screen, which will take you to the Welcome page. Then click on \"Sign up Now\" on the right side of the page.     You " "will be asked to enter the access code listed below, as well as some personal information. Please follow the directions to create your username and password.     Your access code is: [email or web address]  Expires: 2018  7:42 PM     Your access code will  in 90 days. If you need help or a new code, please call your [place] clinic or [phone].        Care EveryWhere ID     This is your Care EveryWhere ID. This could be used by other organizations to access your [place] medical records  [id number]        Your Vitals Were     Pulse Temperature Height Pulse Oximetry BMI (Body Mass Index)       83 97.6  F (36.4  C) (Tympanic) 5' 4\" (1.626 m) 99% 29.18 kg/m2        Blood Pressure from Last 3 Encounters:   18 124/80   18 110/84   18 143/91    Weight from Last 3 Encounters:   18 170 lb (77.1 kg)   18 170 lb (77.1 kg)   17 170 lb (77.1 kg)              Today, you had the following     No orders found for display       Primary Care Provider Office Phone # Fax #    Caren Reardon [phone]       [address] [place] [address]        Equal Access to Services     PA[facility]IE [facility] AH: Hadii terrell ku hadasho Soomaali, waaxda luqadaha, qaybta kaalmada adeegyamichele, connie hernandez. So [facility] [phone].    ATENCIÓN: Si habla español, tiene a gramajo disposición servicios gratuitos de asistencia lingüística. Llame al [phone].    We comply with applicable federal civil rights laws and [place] laws. We do not discriminate on the basis of race, color, national origin, age, disability, sex, sexual orientation, or gender identity.            Thank you!     Thank you for choosing  ORTHOPEDICS  for your care. Our goal is always to provide you with excellent care. Hearing back from our patients is one way we can continue to improve our services. Please take a few minutes to complete the written survey that you may receive in the mail after your " visit with us. Thank you!             Your Updated Medication List - Protect others around you: Learn how to safely use, store and throw away your medicines at www.disposemymeds.org.      Notice  As of 3/23/2018  4:20 PM    You have not been prescribed any medications.

## 2018-03-24 ENCOUNTER — APPOINTMENT (OUTPATIENT)
Dept: GENERAL RADIOLOGY | Facility: HOSPITAL | Age: 23
End: 2018-03-24
Attending: EMERGENCY MEDICINE
Payer: COMMERCIAL

## 2018-03-24 ENCOUNTER — TRANSFERRED RECORDS (OUTPATIENT)
Dept: HEALTH INFORMATION MANAGEMENT | Facility: CLINIC | Age: 23
End: 2018-03-24

## 2018-03-24 ENCOUNTER — HOSPITAL ENCOUNTER (EMERGENCY)
Facility: HOSPITAL | Age: 23
Discharge: ANOTHER HOSPITAL | End: 2018-03-24
Attending: EMERGENCY MEDICINE | Admitting: EMERGENCY MEDICINE
Payer: COMMERCIAL

## 2018-03-24 VITALS
HEART RATE: 89 BPM | DIASTOLIC BLOOD PRESSURE: 62 MMHG | RESPIRATION RATE: 18 BRPM | SYSTOLIC BLOOD PRESSURE: 111 MMHG | TEMPERATURE: 97.8 F | OXYGEN SATURATION: 97 %

## 2018-03-24 DIAGNOSIS — S82.891B TYPE I OR II OPEN FRACTURE OF RIGHT ANKLE, INITIAL ENCOUNTER: Primary | ICD-10-CM

## 2018-03-24 DIAGNOSIS — S82.831A CLOSED FRACTURE OF DISTAL END OF RIGHT FIBULA, UNSPECIFIED FRACTURE MORPHOLOGY, INITIAL ENCOUNTER: ICD-10-CM

## 2018-03-24 LAB
ALBUMIN SERPL-MCNC: 4.1 G/DL (ref 3.4–5)
ALP SERPL-CCNC: 56 U/L (ref 40–150)
ALT SERPL W P-5'-P-CCNC: 25 U/L (ref 0–50)
AMPHETAMINES UR QL SCN: NEGATIVE
ANION GAP SERPL CALCULATED.3IONS-SCNC: 10 MMOL/L (ref 3–14)
AST SERPL W P-5'-P-CCNC: 27 U/L (ref 0–45)
BARBITURATES UR QL: NEGATIVE
BASOPHILS # BLD AUTO: 0 10E9/L (ref 0–0.2)
BASOPHILS NFR BLD AUTO: 0.3 %
BENZODIAZ UR QL: NEGATIVE
BILIRUB SERPL-MCNC: 0.3 MG/DL (ref 0.2–1.3)
BUN SERPL-MCNC: 6 MG/DL (ref 7–30)
CALCIUM SERPL-MCNC: 8.9 MG/DL (ref 8.5–10.1)
CANNABINOIDS UR QL SCN: POSITIVE
CHLORIDE SERPL-SCNC: 110 MMOL/L (ref 94–109)
CO2 SERPL-SCNC: 21 MMOL/L (ref 20–32)
COCAINE UR QL: NEGATIVE
CREAT SERPL-MCNC: 0.72 MG/DL (ref 0.52–1.04)
DIFFERENTIAL METHOD BLD: ABNORMAL
EOSINOPHIL # BLD AUTO: 0.2 10E9/L (ref 0–0.7)
EOSINOPHIL NFR BLD AUTO: 1.5 %
ERYTHROCYTE [DISTWIDTH] IN BLOOD BY AUTOMATED COUNT: 13.1 % (ref 10–15)
ETHANOL SERPL-MCNC: 0.26 G/DL
GFR SERPL CREATININE-BSD FRML MDRD: >90 ML/MIN/1.7M2
GLUCOSE SERPL-MCNC: 84 MG/DL (ref 70–99)
HCG UR QL: NEGATIVE
HCT VFR BLD AUTO: 43.3 % (ref 35–47)
HGB BLD-MCNC: 14.9 G/DL (ref 11.7–15.7)
IMM GRANULOCYTES # BLD: 0 10E9/L (ref 0–0.4)
IMM GRANULOCYTES NFR BLD: 0.3 %
LYMPHOCYTES # BLD AUTO: 4 10E9/L (ref 0.8–5.3)
LYMPHOCYTES NFR BLD AUTO: 32.3 %
MCH RBC QN AUTO: 34.1 PG (ref 26.5–33)
MCHC RBC AUTO-ENTMCNC: 34.4 G/DL (ref 31.5–36.5)
MCV RBC AUTO: 99 FL (ref 78–100)
METHADONE UR QL SCN: NEGATIVE
MONOCYTES # BLD AUTO: 1 10E9/L (ref 0–1.3)
MONOCYTES NFR BLD AUTO: 8 %
NEUTROPHILS # BLD AUTO: 7.1 10E9/L (ref 1.6–8.3)
NEUTROPHILS NFR BLD AUTO: 57.6 %
NRBC # BLD AUTO: 0 10*3/UL
NRBC BLD AUTO-RTO: 0 /100
OPIATES UR QL SCN: NEGATIVE
PCP UR QL SCN: NEGATIVE
PLATELET # BLD AUTO: 240 10E9/L (ref 150–450)
POTASSIUM SERPL-SCNC: 3.7 MMOL/L (ref 3.4–5.3)
PROT SERPL-MCNC: 8.6 G/DL (ref 6.8–8.8)
RBC # BLD AUTO: 4.37 10E12/L (ref 3.8–5.2)
SODIUM SERPL-SCNC: 141 MMOL/L (ref 133–144)
WBC # BLD AUTO: 12.3 10E9/L (ref 4–11)

## 2018-03-24 PROCEDURE — 25000128 H RX IP 250 OP 636

## 2018-03-24 PROCEDURE — 36415 COLL VENOUS BLD VENIPUNCTURE: CPT | Performed by: EMERGENCY MEDICINE

## 2018-03-24 PROCEDURE — 80307 DRUG TEST PRSMV CHEM ANLYZR: CPT | Performed by: EMERGENCY MEDICINE

## 2018-03-24 PROCEDURE — 80053 COMPREHEN METABOLIC PANEL: CPT | Performed by: EMERGENCY MEDICINE

## 2018-03-24 PROCEDURE — 85025 COMPLETE CBC W/AUTO DIFF WBC: CPT | Performed by: EMERGENCY MEDICINE

## 2018-03-24 PROCEDURE — 81025 URINE PREGNANCY TEST: CPT | Performed by: EMERGENCY MEDICINE

## 2018-03-24 PROCEDURE — 25000128 H RX IP 250 OP 636: Performed by: EMERGENCY MEDICINE

## 2018-03-24 PROCEDURE — 99283 EMERGENCY DEPT VISIT LOW MDM: CPT | Mod: Z6 | Performed by: EMERGENCY MEDICINE

## 2018-03-24 PROCEDURE — 99285 EMERGENCY DEPT VISIT HI MDM: CPT | Mod: 25

## 2018-03-24 PROCEDURE — 80320 DRUG SCREEN QUANTALCOHOLS: CPT | Performed by: EMERGENCY MEDICINE

## 2018-03-24 PROCEDURE — 73610 X-RAY EXAM OF ANKLE: CPT | Mod: TC,RT

## 2018-03-24 PROCEDURE — 96365 THER/PROPH/DIAG IV INF INIT: CPT

## 2018-03-24 PROCEDURE — 96375 TX/PRO/DX INJ NEW DRUG ADDON: CPT

## 2018-03-24 RX ORDER — ONDANSETRON 2 MG/ML
4 INJECTION INTRAMUSCULAR; INTRAVENOUS ONCE
Status: COMPLETED | OUTPATIENT
Start: 2018-03-24 | End: 2018-03-24

## 2018-03-24 RX ORDER — CEFAZOLIN SODIUM 2 G/100ML
2 INJECTION, SOLUTION INTRAVENOUS ONCE
Status: DISCONTINUED | OUTPATIENT
Start: 2018-03-24 | End: 2018-03-24 | Stop reason: HOSPADM

## 2018-03-24 RX ADMIN — ONDANSETRON 4 MG: 2 INJECTION INTRAMUSCULAR; INTRAVENOUS at 02:52

## 2018-03-24 RX ADMIN — CEFAZOLIN SODIUM 1 G: 1 INJECTION, SOLUTION INTRAVENOUS at 03:54

## 2018-03-24 RX ADMIN — HYDROMORPHONE HYDROCHLORIDE 1 MG: 1 INJECTION, SOLUTION INTRAMUSCULAR; INTRAVENOUS; SUBCUTANEOUS at 02:52

## 2018-03-24 RX ADMIN — HYDROMORPHONE HYDROCHLORIDE 1 MG: 1 INJECTION, SOLUTION INTRAMUSCULAR; INTRAVENOUS; SUBCUTANEOUS at 03:48

## 2018-03-24 RX ADMIN — CEFAZOLIN SODIUM 1 G: 1 INJECTION, SOLUTION INTRAVENOUS at 03:32

## 2018-03-24 NOTE — ED NOTES
"Pt arrives via [facility] with report of Rt ankle pain, swelling and deformity after fall \"up stairs\".  Pt is tearful, crying  "

## 2018-03-24 NOTE — ED PROVIDER NOTES
History     Chief Complaint   Patient presents with     Leg Injury     HPI  Reshma Martinez is a 23 year old female who presents to the emergency department after falling while going upstairs in a bar.  She sustained a deformity of the right ankle and suspects a fracture.  Patient declined any treatment by EMS team prior to arrival in the ED.    Problem List:    Patient Active Problem List    Diagnosis Date Noted     Alcohol abuse 09/11/2017     Priority: Medium     Marijuana use 09/11/2017     Priority: Medium     Insufficient endocervical or transformation zone component in cervical specimen 07/05/2016     Priority: Medium     NO SHOW 10/26/2015     Priority: Medium     No showed Dr. Ludwig  No showed Dr. Ludwig 9/28/16  No showed Dr. Ludwig 7/12/17       Alopecia 09/16/2015     Priority: Medium     Ovarian mass 09/16/2015     Priority: Medium     Ecchymosis 08/25/2015     Priority: Medium     Easy bruisability 08/25/2015     Priority: Medium     Abdominal pain, generalized 08/25/2015     Priority: Medium     Tobacco abuse 08/25/2015     Priority: Medium     Chest pain 08/25/2015     Priority: Medium     Encounter for routine gynecological examination 04/29/2015     Priority: Medium     Problem list name updated by automated process. Provider to review       Vaginal spotting 08/27/2014     Priority: Medium     Family planning 03/12/2014     Priority: Medium     nexplanon       Status post vaginal delivery 02/21/2014     Priority: Medium     Oziel. Rt labial lac un 5#7       Acute stress reaction 09/04/2013     Priority: Medium     Need for HPV vaccination 09/04/2013     Priority: Medium     Needs Gardasil #3 postpartum       Outbursts of anger 09/04/2013     Priority: Medium     Need for vaccination 09/04/2013     Priority: Medium     Gardasil post partum  Problem list name updated by automated process. Provider to review       Positive urine drug screen 08/08/2013     Priority: Medium     Confirmed + THC. Went into  rehab and states is clean now 1/7/14       Multiple body piercings 07/31/2013     Priority: Medium     Tobacco abuse 10/02/2012     Priority: Medium     Attention deficit disorder 11/21/2011     Priority: Medium     Problem list name updated by automated process. 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     Tobacco abuse 10/02/2012       Past Surgical History:    Past Surgical History:   Procedure Laterality Date     DILATION AND CURETTAGE, HYSTEROSCOPY DIAGNOSTIC, COMBINED  4/11/2014    Procedure: DILATION AND CURETTAGE, HYSTEROSCOPY;  Surgeon: Horace Ledbetter MD;  Location: [facility]     ORIF hand  2012    right     removal of hardware hand  2012    right       Family History:    Family History   Problem Relation Age of Onset     CANCER Maternal Grandfather      cause of death     Other - See Comments Maternal Grandfather      leukemia     Other - See Comments Other      psychiatric illness; family h/o     Other - See Comments Mother      Going through tests at [employer]     Other - See Comments Father      Heart Disease       Social History:  Marital Status:  Single [1]  Social History   Substance Use Topics     Smoking status: Current Every Day Smoker     Packs/day: 1.00     Years: 10.00     Types: Cigarettes     Smokeless tobacco: Never Used      Comment: pt denied quit plan 3/23/18     Alcohol use Yes      Comment: rarely        Medications:      No  current outpatient prescriptions on file.      Review of Systems   All other systems reviewed and are negative.      Physical Exam   BP: (!) 122/102  Pulse: 88  Temp: 97.8  F (36.6  C)  Resp: 20  SpO2: 98 %      Physical Exam   Constitutional: She is oriented to person, place, and time. She appears well-developed and well-nourished. She appears distressed.   HENT:   Head: Normocephalic and atraumatic.   Cardiovascular: Normal rate, regular rhythm, normal heart sounds and intact distal pulses.    Pulmonary/Chest: Effort normal and breath sounds normal. No respiratory distress.   Abdominal: Soft. Bowel sounds are normal.   Musculoskeletal: She exhibits tenderness and deformity. She exhibits no edema.        Feet:    Neurological: She is alert and oriented to person, place, and time.   Skin: Skin is warm. No rash noted. She is not diaphoretic.   Nursing note and vitals reviewed.      ED Course   Patient evaluated and laboratory tests ordered.  Dilaudid IV given for pain control together with Zofran.  X-ray of the right ankle ordered.  Splint applied to the right ankle.    ED Course     Procedures      Medications   HYDROmorphone (DILAUDID) injection 1 mg (1 mg Intravenous Given 3/24/18 0348)   ceFAZolin (ANCEF) intermittent infusion 2 g in 100 mL dextrose PRE-MIX (not administered)   ondansetron (ZOFRAN) injection 4 mg (4 mg Intravenous Given 3/24/18 0252)   ceFAZolin (ANCEF) 1 g intermittent infusion (0 g  ED Infusing on Admission/transfer 3/24/18 0355)   ceFAZolin (ANCEF) 1 g intermittent infusion (0 g  Stopped 3/24/18 0355)       Assessments & Plan (with Medical Decision Making)   Right ankle compound comminuted fracture involving both medial and lateral malleoli: Dr. Soto orthopedic surgeon on-call at [facility] consulted and advised that patient be transferred to their emergency department.  Patient transferred by ALS ambulance to [facility] in the ED.    I have reviewed the nursing notes.    I have  reviewed the findings, diagnosis, plan and need for follow up with the patient.    There are no discharge medications for this patient.      Final diagnoses:   Type I or II open fracture of right ankle, initial encounter   Closed fracture of distal end of right fibula, unspecified fracture morphology, initial encounter       3/24/2018   [facility]     Caleb Vogel MD  03/24/18 0564

## 2018-03-24 NOTE — ED NOTES
"Pt is alert, oriented but intoxicated.  Pt to be transferred via [facility] to [facility] ER.  Pt ankle splinted by MD, [facility] in air splint. Pt tolerated.  Pt given 2nd dose of dilaudid prior to splinting. Pt tearful throughout stay, crying out \"I don't want to go alone, I dont want to wake up from surgery alone\".  CMS intact after splinting. Pulses present.    "

## 2018-06-03 ENCOUNTER — HOSPITAL ENCOUNTER (EMERGENCY)
Facility: HOSPITAL | Age: 23
Discharge: HOME | End: 2018-06-03
Attending: PHYSICIAN ASSISTANT | Admitting: PHYSICIAN ASSISTANT

## 2018-06-03 VITALS
TEMPERATURE: 98.7 F | HEART RATE: 73 BPM | DIASTOLIC BLOOD PRESSURE: 88 MMHG | OXYGEN SATURATION: 98 % | RESPIRATION RATE: 16 BRPM | SYSTOLIC BLOOD PRESSURE: 150 MMHG
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
## 2018-06-03 PROCEDURE — G0463 HOSPITAL OUTPT CLINIC VISIT: HCPCS

## 2018-06-03 PROCEDURE — 99213 OFFICE O/P EST LOW 20 MIN: CPT | Performed by: PHYSICIAN ASSISTANT

## 2018-06-03 PROCEDURE — 25000132 ZZH RX MED GY IP 250 OP 250 PS 637: Performed by: PHYSICIAN ASSISTANT

## 2018-06-03 RX ORDER — PERMETHRIN 50 MG/G
CREAM TOPICAL ONCE
Qty: 30 G | Refills: 1 | Status: SHIPPED | OUTPATIENT
Start: 2018-06-03 | End: 2018-06-03

## 2018-06-03 RX ORDER — DIPHENHYDRAMINE HCL 25 MG
TABLET ORAL
Qty: 40 TABLET | Refills: 0 | Status: SHIPPED | OUTPATIENT
Start: 2018-06-03 | End: 2019-11-14

## 2018-06-03 RX ORDER — DIPHENHYDRAMINE HCL 25 MG
50 CAPSULE ORAL ONCE
Status: COMPLETED | OUTPATIENT
Start: 2018-06-03 | End: 2018-06-03

## 2018-06-03 RX ADMIN — DIPHENHYDRAMINE HYDROCHLORIDE 50 MG: 25 CAPSULE ORAL at 19:55

## 2018-06-03 ASSESSMENT — ENCOUNTER SYMPTOMS
PSYCHIATRIC NEGATIVE: 1
CONSTITUTIONAL NEGATIVE: 1
NEUROLOGICAL NEGATIVE: 1
CARDIOVASCULAR NEGATIVE: 1

## 2018-06-03 NOTE — ED AVS SNAPSHOT
[facility]    [address] [address]    Phone:  [phone]                                       Reshma Martinez   MRN: [id number]    Department:  [facility]   Date of Visit:  6/3/2018           After Visit Summary Signature Page     I have received my discharge instructions, and my questions have been answered. I have discussed any challenges I see with this plan with the nurse or doctor.    ..........................................................................................................................................  Patient/Patient Representative Signature      ..........................................................................................................................................  Patient Representative Print Name and Relationship to Patient    ..................................................               ................................................  Date                                            Time    ..........................................................................................................................................  Reviewed by Signature/Title    ...................................................              ..............................................  Date                                                            Time

## 2018-06-03 NOTE — ED AVS SNAPSHOT
[facility]    [address] [place] [address]    Phone:  [phone]                                       Reshma Martinez   MRN: [id number]    Department:  [facility]   Date of Visit:  6/3/2018           Patient Information     Date Of Birth          1995        Your diagnoses for this visit were:     Insect bite, initial encounter        You were seen by Beverly Franklin PA.      Follow-up Information     Follow up with Caren Reardon MD.    Specialty:  Family Practice    Why:  If symptoms worsen    Contact information:    [address]  [place] [address]  [phone]          Follow up with [facility].    Specialty:  EMERGENCY MEDICINE    Why:  If further concerns develop    Contact information:    [address]  [place] [address] [phone]    Additional information:    From [facility]: Take [address]. Turn left at [address]. Turn left at the first stoplight on [address]. At the first stop sign, take a right onto [address]. Take a left into the parking lot and continue through until you reach the North enterance of the building.       From [place]: Take [address]. Take the [address] ramp towards [place]. Turn left onto [address]. Take a slight right onto [address]. Turn left at the first stoplight on [address]. At the first stop sign, take a right onto [address]. Take a left into the parking lot and continue through until you reach the North enterance of the building.       From [place]: Take [address]. Take a right at [address]. At the first stop sign, take a right onto [address]. Take a left into the parking lot and continue through until you reach the North enterance of the building.       Discharge References/Attachments     BEDBUGS (ENGLISH)    MOSQUITO BITE (ENGLISH)    SCABIES (ENGLISH)         Review of your medicines  "     START taking        Dose / Directions Last dose taken    diphenhydrAMINE 25 MG tablet   Commonly known as:  BENADRYL   Quantity:  40 tablet        Take 1-2 tablets every 6-8 hours as needed for itching   Refills:  0        permethrin 5 % cream   Commonly known as:  ELIMITE   Quantity:  30 g        Apply topically once for 1 dose Massage into skin from head to foot.  Leave on for 8-14hrs and then wash off.  May repeat in 2 wks if needed.   Refills:  1                Prescriptions were sent or printed at these locations (2 Prescriptions)                   [facility] - [address]   [address] [place] [address]    Telephone:  [phone]   Fax:  [phone]   Hours:                  E-Prescribed (1 of 2)         diphenhydrAMINE (BENADRYL) 25 MG tablet                 Printed at Department/Unit printer (1 of 2)         permethrin (ELIMITE) 5 % cream                Orders Needing Specimen Collection     None      Pending Results     No orders found from 6/1/2018 to 6/4/2018.            Pending Culture Results     No orders found from 6/1/2018 to 6/4/2018.            Thank you for choosing [facility]       Thank you for choosing [facility] for your care. Our goal is always to provide you with excellent care. Hearing back from our patients is one way we can continue to improve our services. Please take a few minutes to complete the written survey that you may receive in the mail after you visit with us. Thank you!        [facility] Information     [facility] lets you send messages to your doctor, view your test results, renew your prescriptions, schedule appointments and more. To sign up, go to [email or web address] . Click on \"Log in\" on the left side of the screen, which will take you to the Welcome page. Then click on \"Sign up Now\" on the right side of the page.     You will be asked to enter the access code listed below, as well as some personal information. Please follow the " directions to create your username and password.     Your access code is: [email or web address]  Expires: 2018  8:05 PM     Your access code will  in 90 days. If you need help or a new code, please call your [place] clinic or [phone].        Care EveryWhere ID     This is your Care EveryWhere ID. This could be used by other organizations to access your [place] medical records  [id number]        Equal Access to Services     ENRIQUE JAMES : Hadii terrell green hadasho Soomaali, waaxda luqadaha, qaybta kaalmada adeegyada, connie huertas . So [facility] [phone].    ATENCIÓN: Si habla español, tiene a gramajo disposición servicios gratuitos de asistencia lingüística. Llame al [phone].    We comply with applicable federal civil rights laws and [place] laws. We do not discriminate on the basis of race, color, national origin, age, disability, sex, sexual orientation, or gender identity.            After Visit Summary       This is your record. Keep this with you and show to your community pharmacist(s) and doctor(s) at your next visit.

## 2018-06-04 NOTE — ED PROVIDER NOTES
"  History     Chief Complaint   Patient presents with     Rash     The history is provided by the patient. No  was used.     Reshma Martinez is a 23 year old female who has developed a rash mainly on her arms and upper chest/back over the las \"few weeks.\"  Admits it got worse after sleeping at a friends house this week. Itches intermittently    Problem List:    Patient Active Problem List    Diagnosis Date Noted     Alcohol abuse 09/11/2017     Priority: Medium     Marijuana use 09/11/2017     Priority: Medium     Insufficient endocervical or transformation zone component in cervical specimen 07/05/2016     Priority: Medium     NO SHOW 10/26/2015     Priority: Medium     No showed Dr. Ludwig  No showed Dr. Ludwig 9/28/16  No showed Dr. Ludwig 7/12/17       Alopecia 09/16/2015     Priority: Medium     Ovarian mass 09/16/2015     Priority: Medium     Ecchymosis 08/25/2015     Priority: Medium     Easy bruisability 08/25/2015     Priority: Medium     Abdominal pain, generalized 08/25/2015     Priority: Medium     Tobacco abuse 08/25/2015     Priority: Medium     Chest pain 08/25/2015     Priority: Medium     Encounter for routine gynecological examination 04/29/2015     Priority: Medium     Problem list name updated by automated process. Provider to review       Vaginal spotting 08/27/2014     Priority: Medium     Family planning 03/12/2014     Priority: Medium     nexplanon       Status post vaginal delivery 02/21/2014     Priority: Medium     Oziel. Rt labial lac un 5#7       Acute stress reaction 09/04/2013     Priority: Medium     Need for HPV vaccination 09/04/2013     Priority: Medium     Needs Gardasil #3 postpartum       Outbursts of anger 09/04/2013     Priority: Medium     Need for vaccination 09/04/2013     Priority: Medium     Gardasil post partum  Problem list name updated by automated process. Provider to review       Positive urine drug screen 08/08/2013     Priority: Medium     Confirmed " + THC. Went into rehab and states is clean now 1/7/14       Multiple body piercings 07/31/2013     Priority: Medium     Tobacco abuse 10/02/2012     Priority: Medium     Attention deficit disorder 11/21/2011     Priority: Medium     Problem list name updated by automated process. 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     Tobacco abuse 10/02/2012       Past Surgical History:    Past Surgical History:   Procedure Laterality Date     C OPEN RX ANKLE DISLOCATN+FIXATN Right 03/24/2018    closed bimalleolar fracture     DILATION AND CURETTAGE, HYSTEROSCOPY DIAGNOSTIC, COMBINED  4/11/2014    Procedure: DILATION AND CURETTAGE, HYSTEROSCOPY;  Surgeon: Horace Ledbetter MD;  Location: [facility]     ORIF hand  2012    right     removal of hardware hand  2012    right       Family History:    Family History   Problem Relation Age of Onset     CANCER Maternal Grandfather      cause of death     Other - See Comments Maternal Grandfather      leukemia     Other - See Comments Other      psychiatric illness; family h/o     Other - See Comments Mother      Going through tests at [place]     Other - See Comments Father      Heart Disease       Social History:  Marital Status:  Single [1]  Social History   Substance Use Topics     Smoking status: Current Every Day Smoker     Packs/day: 1.00     Years: 10.00     Types: Cigarettes     Smokeless tobacco: Never Used       Comment: pt denied quit plan 3/23/18     Alcohol use Yes      Comment: rarely        Medications:      diphenhydrAMINE (BENADRYL) 25 MG tablet   permethrin (ELIMITE) 5 % cream         Review of Systems   Constitutional: Negative.    HENT: Negative.    Cardiovascular: Negative.    Skin: Positive for rash.   Neurological: Negative.    Psychiatric/Behavioral: Negative.        Physical Exam   BP: 150/88  Pulse: 73  Temp: 98.7  F (37.1  C)  Resp: 16  SpO2: 98 %      Physical Exam   Constitutional: She is oriented to person, place, and time. She appears well-developed and well-nourished. No distress.   Cardiovascular: Normal rate.    Pulmonary/Chest: Effort normal.   Neurological: She is alert and oriented to person, place, and time.   Skin: Rash noted. She is not diaphoretic.   Pt has moderate diffuse erythematous papules over her arms and upper chest/upper back. No pustules. No vesicles. No TTP   Psychiatric: She has a normal mood and affect.   Nursing note and vitals reviewed.      ED Course     ED Course     Procedures            Medications   diphenhydrAMINE (BENADRYL) capsule 50 mg (50 mg Oral Given 6/3/18 1955)     Pt tolerated well  Assessments & Plan (with Medical Decision Making)     I have reviewed the nursing notes.    I have reviewed the findings, diagnosis, plan and need for follow up with the patient.      Discharge Medication List as of 6/3/2018  8:05 PM      START taking these medications    Details   diphenhydrAMINE (BENADRYL) 25 MG tablet Take 1-2 tablets every 6-8 hours as needed for itching, Disp-40 tablet, R-0, E-Prescribe      permethrin (ELIMITE) 5 % cream Apply topically once for 1 dose Massage into skin from head to foot.  Leave on for 8-14hrs and then wash off.  May repeat in 2 wks if needed.Disp-30 g, R-1Local Print             Final diagnoses:   Insect bite, initial encounter             Patient verbally educated and given appropriate education sheets for the diagnoses and has no  questions.  Take medications as directed.   Follow up with your Primary Care provider if symptoms increase or if further concerns develop, return to the ER  Beverly Moran  Physician Assistant  6/5/2018  2:13 PM  URGENT CARE CLINIC        Beverly Moran  Physician Assistant  6/5/2018  2:12 PM  URGENT CARE CLINIC  6/3/2018   [facility]     Beverly Franklin PA  06/05/18 2851

## 2018-06-04 NOTE — ED NOTES
"C/o red spotty rash to arms and back pt states that they itch sometimes and has had \"a few here and there for a few weeks\" worse today   "

## 2018-07-03 ENCOUNTER — HOSPITAL ENCOUNTER (EMERGENCY)
Facility: HOSPITAL | Age: 23
Discharge: HOME | End: 2018-07-03
Attending: NURSE PRACTITIONER | Admitting: NURSE PRACTITIONER

## 2018-07-03 ENCOUNTER — APPOINTMENT (OUTPATIENT)
Dept: GENERAL RADIOLOGY | Facility: HOSPITAL | Age: 23
End: 2018-07-03
Attending: FAMILY MEDICINE

## 2018-07-03 VITALS
SYSTOLIC BLOOD PRESSURE: 151 MMHG | TEMPERATURE: 97.8 F | OXYGEN SATURATION: 98 % | RESPIRATION RATE: 16 BRPM | DIASTOLIC BLOOD PRESSURE: 90 MMHG

## 2018-07-03 DIAGNOSIS — S93.401A SPRAIN OF RIGHT ANKLE, UNSPECIFIED LIGAMENT, INITIAL ENCOUNTER: ICD-10-CM

## 2018-07-03 PROCEDURE — 73610 X-RAY EXAM OF ANKLE: CPT | Mod: TC,RT

## 2018-07-03 PROCEDURE — 99213 OFFICE O/P EST LOW 20 MIN: CPT | Performed by: NURSE PRACTITIONER

## 2018-07-03 PROCEDURE — G0463 HOSPITAL OUTPT CLINIC VISIT: HCPCS

## 2018-07-03 ASSESSMENT — ENCOUNTER SYMPTOMS
NUMBNESS: 0
ACTIVITY CHANGE: 1
WEAKNESS: 0
PSYCHIATRIC NEGATIVE: 1
TROUBLE SWALLOWING: 0
APPETITE CHANGE: 0
DYSURIA: 0
FEVER: 0
CHILLS: 0
COUGH: 0

## 2018-07-03 NOTE — ED PROVIDER NOTES
History     Chief Complaint   Patient presents with     Ankle Pain     right ankle pain     The history is provided by the patient. No  was used.     Reshma Martinez is a 23 year old female who presents with right ankle pain that started today after she fell. She did not hit her head. Denies other injuries. She applied ice and elevated right foot with mild effectiveness. Eating and drinking well. Bowel and bladder are working well. She is able to bear weight on right foot. She has a history of right ankle fracture with ORIF on 3/24/2018.       Problem List:    Patient Active Problem List    Diagnosis Date Noted     Alcohol abuse 09/11/2017     Priority: Medium     Marijuana use 09/11/2017     Priority: Medium     Insufficient endocervical or transformation zone component in cervical specimen 07/05/2016     Priority: Medium     NO SHOW 10/26/2015     Priority: Medium     No showed Dr. Ludwig  No showed Dr. Ludwig 9/28/16  No showed Dr. Ludwig 7/12/17       Alopecia 09/16/2015     Priority: Medium     Ovarian mass 09/16/2015     Priority: Medium     Ecchymosis 08/25/2015     Priority: Medium     Easy bruisability 08/25/2015     Priority: Medium     Abdominal pain, generalized 08/25/2015     Priority: Medium     Tobacco abuse 08/25/2015     Priority: Medium     Chest pain 08/25/2015     Priority: Medium     Encounter for routine gynecological examination 04/29/2015     Priority: Medium     Problem list name updated by automated process. Provider to review       Vaginal spotting 08/27/2014     Priority: Medium     Family planning 03/12/2014     Priority: Medium     nexplanon       Status post vaginal delivery 02/21/2014     Priority: Medium     Oziel. Rt labial lac un 5#7       Acute stress reaction 09/04/2013     Priority: Medium     Need for HPV vaccination 09/04/2013     Priority: Medium     Needs Gardasil #3 postpartum       Outbursts of anger 09/04/2013     Priority: Medium     Need for  vaccination 09/04/2013     Priority: Medium     Gardasil post partum  Problem list name updated by automated process. Provider to review       Positive urine drug screen 08/08/2013     Priority: Medium     Confirmed + THC. Went into rehab and states is clean now 1/7/14       Multiple body piercings 07/31/2013     Priority: Medium     Tobacco abuse 10/02/2012     Priority: Medium     Attention deficit disorder 11/21/2011     Priority: Medium     Problem list name updated by automated process. 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     Tobacco abuse 10/02/2012       Past Surgical History:    Past Surgical History:   Procedure Laterality Date     C OPEN RX ANKLE DISLOCATN+FIXATN Right 03/24/2018    closed bimalleolar fracture     DILATION AND CURETTAGE, HYSTEROSCOPY DIAGNOSTIC, COMBINED  4/11/2014    Procedure: DILATION AND CURETTAGE, HYSTEROSCOPY;  Surgeon: Horace Ledbetter MD;  Location: [facility]     ORIF hand  2012    right     removal of hardware hand  2012    right       Family History:    Family History   Problem Relation Age of Onset     Cancer Maternal Grandfather      cause of death     Other - See Comments Maternal Grandfather      leukemia     Other - See Comments Other      psychiatric illness; family h/o     Other - See Comments Mother      Going through tests at [facility]     Other - See Comments Father      Heart Disease       Social  History:  Marital Status:  Single [1]  Social History   Substance Use Topics     Smoking status: Current Every Day Smoker     Packs/day: 1.00     Years: 10.00     Types: Cigarettes     Smokeless tobacco: Never Used      Comment: pt denied quit plan 3/23/18     Alcohol use Yes      Comment: rarely        Medications:      diphenhydrAMINE (BENADRYL) 25 MG tablet         Review of Systems   Constitutional: Positive for activity change. Negative for appetite change, chills and fever.   HENT: Negative for trouble swallowing.    Respiratory: Negative for cough.    Genitourinary: Negative for dysuria.   Musculoskeletal:        Right ankle pain.    Skin: Negative for rash.   Neurological: Negative for weakness and numbness.   Psychiatric/Behavioral: Negative.        Physical Exam   BP: 151/90  Heart Rate: 102  Temp: 97.8  F (36.6  C)  Resp: 16  SpO2: 98 %      Physical Exam   Constitutional: She is oriented to person, place, and time. She appears well-developed and well-nourished. No distress.   HENT:   Head: Normocephalic.   Mouth/Throat: Oropharynx is clear and moist.   Neck: Normal range of motion. Neck supple.   Cardiovascular: Normal rate, regular rhythm, normal heart sounds and intact distal pulses.    No murmur heard.  Pulmonary/Chest: Effort normal. No respiratory distress.   Abdominal: Soft. She exhibits no distension.   Musculoskeletal: She exhibits tenderness. She exhibits no edema or deformity.   CMS and ROM intact to right lower extremity. Right dorsalis pedis +2. Extension and flexion intact to all digits on right foot. Tenderness to right lateral malleolus.    Neurological: She is alert and oriented to person, place, and time. She displays normal reflexes. She exhibits normal muscle tone.   Skin: Skin is warm and dry. No rash noted. She is not diaphoretic. No erythema.   Psychiatric: She has a normal mood and affect. Her behavior is normal.   Nursing note and vitals reviewed.      ED Course     ED Course      Procedures    I personally reviewed the x-rays and there is NO fracture or dislocation. Radiology review pending and nurse will notify patient if there is any change in the treatment plan.    Results for orders placed or performed during the hospital encounter of 07/03/18   Ankle XR, G/E 3 views, right    Narrative    XR ANKLE RT G/E 3 VW    HISTORY: 23 yearsFemale recent fracture; new injury; pain;     TECHNIQUE:3 views right ankle    COMPARISON: 3/24/2018    FINDINGS: There has been screw plate fixation of the lateral malleolar  fracture and screw fixation of the medial malleolar fracture. There is  slight cortical irregularity at the location of the posterior  malleolar fracture. Fracture fragments are well opposed, alignment is  anatomic and lucent fracture lines are not readily visible. The ankle  mortise is congruent      Impression    IMPRESSION: Anatomic alignment of healing right ankle fractures as  described above without evidence of acute fracture.    PETER SALDANA MD       Assessments & Plan (with Medical Decision Making)     Discussed plan of care. She verbalized understanding. All questions answered.     I have reviewed the nursing notes.    I have reviewed the findings, diagnosis, plan and need for follow up with the patient.  Discharged in stable condition.     New Prescriptions    No medications on file       Final diagnoses:   Sprain of right ankle, unspecified ligament, initial encounter     Take tylenol and/or ibuprofen for pain. Follow dosing on package.   Apply ice to right ankle for 20 minutes every 1-2 hours. Protect skin.   Elevate right ankle as much as able.   See RICE handout.   Wear ankle stirup until pain improves.   Follow up with PCP in 10 days.   Return to urgent care or emergency department with any increase in symptoms or concerns.     BLAINE Donnelly  7/3/2018  9:17 AM  [facility]       Melida Gary NP  07/05/18 1944

## 2018-07-03 NOTE — ED AVS SNAPSHOT
[facility]    [address] [address]    Phone:  [phone]                                       Reshma Martinez   MRN: [id number]    Department:  [facility]   Date of Visit:  7/3/2018           After Visit Summary Signature Page     I have received my discharge instructions, and my questions have been answered. I have discussed any challenges I see with this plan with the nurse or doctor.    ..........................................................................................................................................  Patient/Patient Representative Signature      ..........................................................................................................................................  Patient Representative Print Name and Relationship to Patient    ..................................................               ................................................  Date                                            Time    ..........................................................................................................................................  Reviewed by Signature/Title    ...................................................              ..............................................  Date                                                            Time

## 2018-07-03 NOTE — ED TRIAGE NOTES
"Patient reports she had a tib/fib fracture and had her cast removed on 4/23/18.  Patient states she was suppose to wear a walking boot for 5 weeks, but only wore it for 3 weeks.  Since then she has not been able to go back to [facility] for recheck/follow up.  2 weeks ago and again 2 days ago patient had \"falling\" episode and is concerned she may have re-injured her leg.  C/o increase swelling and pain.  Patient is able to bear weight.  Denies any numbness or tingling just a \"stong sharp pain\"  "

## 2018-07-03 NOTE — ED NOTES
Pt reports right ankle pain for last 2 weeks, recent fracture. Pt reports that she did not follow up and did not wear her walking boot as long as she was supposed to

## 2018-07-03 NOTE — ED AVS SNAPSHOT
[facility]    [address]    [facility] [place] [address]    Phone:  [phone]                                       Reshma Martinez   MRN: [id number]    Department:  [facility]   Date of Visit:  7/3/2018           Patient Information     Date Of Birth          1995        Your diagnoses for this visit were:     Sprain of right ankle, unspecified ligament, initial encounter        You were seen by Melida Gary NP.      Follow-up Information     Follow up with Caren Reardon MD In 10 days.    Specialty:  Family Practice    Why:  For re-evaluation.     Contact information:    [address] [place] [address] [phone]          Follow up with [facility].    Specialty:  EMERGENCY MEDICINE    Why:  As needed, If symptoms worsen    Contact information:    [address] [place] [address] [phone]    Additional information:    From [place]: Take [address]. Turn left at [address]. Turn left at the first stoplight on [address]. At the first stop sign, take a right onto [address]. Take a left into the parking lot and continue through until you reach the North enterance of the building.       From [place]: Take [address]. Take the [address] ramp towards [place]. Turn left onto [address]. Take a slight right onto [address]. Turn left at the first stoplight on [address]. At the first stop sign, take a right onto [address]. Take a left into the parking lot and continue through until you reach the North enterance of the building.       From [place]: Take [address]. Take a right at [address]. At the first stop sign, take a right onto [address]. Take a left into the parking lot and continue through until you reach the North enterance of the building.         Discharge Instructions       Take tylenol and/or ibuprofen for pain. Follow dosing on  "package.   Apply ice to right ankle for 20 minutes every 1-2 hours. Protect skin.   Elevate right ankle as much as able.   See RICE handout.   Wear ankle stirup until pain improves.   Follow up with PCP in 10 days.   Return to urgent care or emergency department with any increase in symptoms or concerns.       Discharge References/Attachments     SPRAIN, ANKLE (ADULT) (ENGLISH)    STRAINS AND SPRAINS, SELF-CARE FOR (ENGLISH)    R.I.C.E. (ENGLISH)         Review of your medicines      Our records show that you are taking the medicines listed below. If these are incorrect, please call your family doctor or clinic.        Dose / Directions Last dose taken    diphenhydrAMINE 25 MG tablet   Commonly known as:  BENADRYL   Quantity:  40 tablet        Take 1-2 tablets every 6-8 hours as needed for itching   Refills:  0                Procedures and tests performed during your visit     Ankle XR, G/E 3 views, right      Orders Needing Specimen Collection     None      Pending Results     No orders found from 7/1/2018 to 7/4/2018.            Pending Culture Results     No orders found from 7/1/2018 to 7/4/2018.            Thank you for choosing [facility]       Thank you for choosing [facility] for your care. Our goal is always to provide you with excellent care. Hearing back from our patients is one way we can continue to improve our services. Please take a few minutes to complete the written survey that you may receive in the mail after you visit with us. Thank you!        [employer] Information     [employer] lets you send messages to your doctor, view your test results, renew your prescriptions, schedule appointments and more. To sign up, go to [email or web address] . Click on \"Log in\" on the left side of the screen, which will take you to the Welcome page. Then click on \"Sign up Now\" on the right side of the page.     You will be asked to enter the access code listed below, as well as some personal information. Please follow " the directions to create your username and password.     Your access code is: [email or web address]  Expires: 2018  8:05 PM     Your access code will  in 90 days. If you need help or a new code, please call your [facility] clinic or [phone].        Care EveryWhere ID     This is your Care EveryWhere ID. This could be used by other organizations to access your [facility] medical records  [id number]        Equal Access to Services     ENRIQUE JAMES : Hadii terrell green hadasho Soomaali, waaxda luqadaha, qaybta kaalmada adeegyamichele, connie huertas . So [facility] [phone].    ATENCIÓN: Si habla español, tiene a gramajo disposición servicios gratuitos de asistencia lingüística. Llame al [phone].    We comply with applicable federal civil rights laws and [place] laws. We do not discriminate on the basis of race, color, national origin, age, disability, sex, sexual orientation, or gender identity.            After Visit Summary       This is your record. Keep this with you and show to your community pharmacist(s) and doctor(s) at your next visit.

## 2018-07-03 NOTE — DISCHARGE INSTRUCTIONS
Take tylenol and/or ibuprofen for pain. Follow dosing on package.   Apply ice to right ankle for 20 minutes every 1-2 hours. Protect skin.   Elevate right ankle as much as able.   See RICE handout.   Wear ankle stirup until pain improves.   Follow up with PCP in 10 days.   Return to urgent care or emergency department with any increase in symptoms or concerns.

## 2018-08-06 ENCOUNTER — HOSPITAL ENCOUNTER (EMERGENCY)
Facility: HOSPITAL | Age: 23
Discharge: HOME | End: 2018-08-06
Attending: NURSE PRACTITIONER | Admitting: NURSE PRACTITIONER

## 2018-08-06 VITALS
OXYGEN SATURATION: 98 % | DIASTOLIC BLOOD PRESSURE: 89 MMHG | RESPIRATION RATE: 18 BRPM | TEMPERATURE: 101 F | SYSTOLIC BLOOD PRESSURE: 143 MMHG | HEART RATE: 118 BPM

## 2018-08-06 DIAGNOSIS — J02.0 STREP THROAT: ICD-10-CM

## 2018-08-06 LAB
BACTERIA SPEC CULT: NORMAL
BACTERIA SPEC CULT: NORMAL
DEPRECATED S PYO AG THROAT QL EIA: ABNORMAL
SPECIMEN SOURCE: ABNORMAL
SPECIMEN SOURCE: NORMAL

## 2018-08-06 PROCEDURE — G0463 HOSPITAL OUTPT CLINIC VISIT: HCPCS | Mod: 25

## 2018-08-06 PROCEDURE — 99213 OFFICE O/P EST LOW 20 MIN: CPT | Performed by: NURSE PRACTITIONER

## 2018-08-06 PROCEDURE — 87880 STREP A ASSAY W/OPTIC: CPT | Performed by: NURSE PRACTITIONER

## 2018-08-06 PROCEDURE — 25000132 ZZH RX MED GY IP 250 OP 250 PS 637: Performed by: NURSE PRACTITIONER

## 2018-08-06 PROCEDURE — 25000125 ZZHC RX 250: Performed by: NURSE PRACTITIONER

## 2018-08-06 PROCEDURE — 25000128 H RX IP 250 OP 636: Performed by: NURSE PRACTITIONER

## 2018-08-06 PROCEDURE — 96372 THER/PROPH/DIAG INJ SC/IM: CPT

## 2018-08-06 RX ORDER — ACETAMINOPHEN 325 MG/1
975 TABLET ORAL ONCE
Status: COMPLETED | OUTPATIENT
Start: 2018-08-06 | End: 2018-08-06

## 2018-08-06 RX ORDER — DEXAMETHASONE SODIUM PHOSPHATE 10 MG/ML
10 INJECTION, SOLUTION INTRAMUSCULAR; INTRAVENOUS ONCE
Status: COMPLETED | OUTPATIENT
Start: 2018-08-06 | End: 2018-08-06

## 2018-08-06 RX ORDER — IBUPROFEN 800 MG/1
800 TABLET, FILM COATED ORAL ONCE
Status: COMPLETED | OUTPATIENT
Start: 2018-08-06 | End: 2018-08-06

## 2018-08-06 RX ADMIN — IBUPROFEN 800 MG: 800 TABLET ORAL at 19:50

## 2018-08-06 RX ADMIN — PENICILLIN G BENZATHINE 1.2 MILLION UNITS: 1200000 INJECTION, SUSPENSION INTRAMUSCULAR at 20:19

## 2018-08-06 RX ADMIN — ACETAMINOPHEN 975 MG: 325 TABLET, FILM COATED ORAL at 19:49

## 2018-08-06 RX ADMIN — DEXAMETHASONE SODIUM PHOSPHATE 10 MG: 10 INJECTION, SOLUTION INTRAMUSCULAR; INTRAVENOUS at 20:18

## 2018-08-06 ASSESSMENT — ENCOUNTER SYMPTOMS
SINUS PRESSURE: 0
RHINORRHEA: 0
NAUSEA: 0
DIARRHEA: 0
PSYCHIATRIC NEGATIVE: 1
DYSURIA: 0
FEVER: 1
STRIDOR: 0
ACTIVITY CHANGE: 0
APPETITE CHANGE: 1
SORE THROAT: 1
TROUBLE SWALLOWING: 0
WHEEZING: 0
CHILLS: 1
COUGH: 0
WEAKNESS: 0
SINUS PAIN: 0
SHORTNESS OF BREATH: 0
ABDOMINAL PAIN: 0
VOMITING: 0
VOICE CHANGE: 0

## 2018-08-06 NOTE — ED AVS SNAPSHOT
[facility]    [address] [address]    Phone:  [phone]                                       Reshma Martinez   MRN: [id number]    Department[facility]   Date of Visit:  8/6/2018           After Visit Summary Signature Page     I have received my discharge instructions, and my questions have been answered. I have discussed any challenges I see with this plan with the nurse or doctor.    ..........................................................................................................................................  Patient/Patient Representative Signature      ..........................................................................................................................................  Patient Representative Print Name and Relationship to Patient    ..................................................               ................................................  Date                                            Time    ..........................................................................................................................................  Reviewed by Signature/Title    ...................................................              ..............................................  Date                                                            Time

## 2018-08-06 NOTE — ED AVS SNAPSHOT
[facility]    [address]    [facility] [address]    Phone:  [phone]                                       Reshma Martinez   MRN: [id number]    Department[facility]   Date of Visit:  8/6/2018           Patient Information     Date Of Birth          1995        Your diagnoses for this visit were:     Strep throat        You were seen by Melida Gary NP.      Follow-up Information     Follow up with Caren Reardon MD.    Specialty:  Family Practice    Why:  As needed, If symptoms worsen    Contact information:    [address]  [place] [address]  [phone]          Follow up with [facility].    Specialty:  EMERGENCY MEDICINE    Why:  As needed, If symptoms worsen    Contact information:    [address]  [place] [address] [phone]    Additional information:    From [facility]: Take [address]. Turn left at [address]. Turn left at the first stoplight on [address]. At the first stop sign, take a right onto [address]. Take a left into the parking lot and continue through until you reach the North enterance of the building.       From [place]: Take [address]. Take the [address] ramp towards [place]. Turn left onto [address]. Take a slight right onto [address]. Turn left at the first stoplight on [address]. At the first stop sign, take a right onto [address]. Take a left into the parking lot and continue through until you reach the North enterance of the building.       From [place]: Take [address]. Take a right at [address]. At the first stop sign, take a right onto [address]. Take a left into the parking lot and continue through until you reach the North enterance of the building.         Discharge Instructions       Take tylenol and/or ibuprofen for pain. Follow dosing on package.   Increase water intake.   Throw tooth  "brush out in 5 days.   Follow up with any hot potato voice, trouble swallowing, or any increase in symptoms or concerns.   Follow up with PCP with any increase in symptoms or concerns.   Return to urgent care or emergency department with any increase in symptoms or concerns.     Discharge References/Attachments     PHARYNGITIS, STREP (CONFIRMED) (ENGLISH)    SORE THROAT, WHEN YOU HAVE A (ENGLISH)    SORE THROATS, SELF-CARE FOR (ENGLISH)         Review of your medicines      Our records show that you are taking the medicines listed below. If these are incorrect, please call your family doctor or clinic.        Dose / Directions Last dose taken    diphenhydrAMINE 25 MG tablet   Commonly known as:  BENADRYL   Quantity:  40 tablet        Take 1-2 tablets every 6-8 hours as needed for itching   Refills:  0                Procedures and tests performed during your visit     Beta strep group A culture    Rapid strep screen      Orders Needing Specimen Collection     None      Pending Results     No orders found from 8/4/2018 to 8/7/2018.            Pending Culture Results     No orders found from 8/4/2018 to 8/7/2018.            Thank you for choosing [facility]       Thank you for choosing [facility] for your care. Our goal is always to provide you with excellent care. Hearing back from our patients is one way we can continue to improve our services. Please take a few minutes to complete the written survey that you may receive in the mail after you visit with us. Thank you!        [employer] Information     "[employer]" lets you send messages to your doctor, view your test results, renew your prescriptions, schedule appointments and more. To sign up, go to [email or web address] . Click on \"Log in\" on the left side of the screen, which will take you to the Welcome page. Then click on \"Sign up Now\" on the right side of the page.     You will be asked to enter the access code listed below, as well as some personal information. Please " follow the directions to create your username and password.     Your access code is: [email or web address]  Expires: 2018  8:05 PM     Your access code will  in 90 days. If you need help or a new code, please call your [place] clinic or [phone].        Care EveryWhere ID     This is your Care EveryWhere ID. This could be used by other organizations to access your [place] medical records  [id number]        Equal Access to Services     ENRIQUE JAMES : Hadii aad ku hadasho Soomaali, waaxda luqadaha, qaybta kaalmada adeeglucie, connie huertas . So [facility] [phone].    ATENCIÓN: Si habla español, tiene a gramajo disposición servicios gratuitos de asistencia lingüística. Llame al [phone].    We comply with applicable federal civil rights laws and [place] laws. We do not discriminate on the basis of race, color, national origin, age, disability, sex, sexual orientation, or gender identity.            After Visit Summary       This is your record. Keep this with you and show to your community pharmacist(s) and doctor(s) at your next visit.

## 2018-08-07 NOTE — ED TRIAGE NOTES
Pt presents today with c/o sore throat since yesterday. Pt states it started on the right side and now is bilateral.

## 2018-08-07 NOTE — ED PROVIDER NOTES
History     Chief Complaint   Patient presents with     Pharyngitis     started yest in her right tonsil now left is painful also     The history is provided by the patient. No  was used.     Reshma Martinez is a 23 year old female who presents with a sore throat that started 1 day ago. She's taken tylenol with mild effectiveness. Positive for fever, chills, and night sweats. Decreased appetite. Bowel and bladder are working well. No antibiotic use in the past 30 days.       Problem List:    Patient Active Problem List    Diagnosis Date Noted     Alcohol abuse 09/11/2017     Priority: Medium     Marijuana use 09/11/2017     Priority: Medium     Insufficient endocervical or transformation zone component in cervical specimen 07/05/2016     Priority: Medium     NO SHOW 10/26/2015     Priority: Medium     No showed Dr. Ludwig  No showed Dr. Ludwig 9/28/16  No showed Dr. Ludwig 7/12/17       Alopecia 09/16/2015     Priority: Medium     Ovarian mass 09/16/2015     Priority: Medium     Ecchymosis 08/25/2015     Priority: Medium     Easy bruisability 08/25/2015     Priority: Medium     Abdominal pain, generalized 08/25/2015     Priority: Medium     Tobacco abuse 08/25/2015     Priority: Medium     Chest pain 08/25/2015     Priority: Medium     Encounter for routine gynecological examination 04/29/2015     Priority: Medium     Problem list name updated by automated process. Provider to review       Vaginal spotting 08/27/2014     Priority: Medium     Family planning 03/12/2014     Priority: Medium     nexplanon       Status post vaginal delivery 02/21/2014     Priority: Medium     Oziel. Rt labial lac un 5#7       Acute stress reaction 09/04/2013     Priority: Medium     Need for HPV vaccination 09/04/2013     Priority: Medium     Needs Gardasil #3 postpartum       Outbursts of anger 09/04/2013     Priority: Medium     Need for vaccination 09/04/2013     Priority: Medium     Gardasil post partum  Problem  list name updated by automated process. Provider to review       Positive urine drug screen 08/08/2013     Priority: Medium     Confirmed + THC. Went into rehab and states is clean now 1/7/14       Multiple body piercings 07/31/2013     Priority: Medium     Tobacco abuse 10/02/2012     Priority: Medium     Attention deficit disorder 11/21/2011     Priority: Medium     Problem list name updated by automated process. 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     Tobacco abuse 10/02/2012       Past Surgical History:    Past Surgical History:   Procedure Laterality Date     C OPEN RX ANKLE DISLOCATN+FIXATN Right 03/24/2018    closed bimalleolar fracture     DILATION AND CURETTAGE, HYSTEROSCOPY DIAGNOSTIC, COMBINED  4/11/2014    Procedure: DILATION AND CURETTAGE, HYSTEROSCOPY;  Surgeon: Horace Ledbetter MD;  Location: [facility]     ORIF hand  2012    right     removal of hardware hand  2012    right       Family History:    Family History   Problem Relation Age of Onset     Cancer Maternal Grandfather      cause of death     Other - See Comments Maternal Grandfather      leukemia     Other - See Comments Other      psychiatric illness; family h/o     Other - See Comments Mother      Going through tests at [place]     Other - See Comments Father      Heart Disease       Social History:  Marital Status:  Single [1]  Social History   Substance Use Topics     Smoking  status: Current Every Day Smoker     Packs/day: 1.00     Years: 10.00     Types: Cigarettes     Smokeless tobacco: Never Used      Comment: pt denied quit plan 3/23/18     Alcohol use Yes      Comment: rarely        Medications:      diphenhydrAMINE (BENADRYL) 25 MG tablet         Review of Systems   Constitutional: Positive for appetite change, chills and fever. Negative for activity change.   HENT: Positive for sore throat. Negative for congestion, ear discharge, ear pain, postnasal drip, rhinorrhea, sinus pain, sinus pressure, trouble swallowing and voice change.         Hurts to swallow.    Respiratory: Negative for cough, shortness of breath, wheezing and stridor.    Gastrointestinal: Negative for abdominal pain, diarrhea, nausea and vomiting.   Genitourinary: Negative for dysuria.   Skin: Negative for rash.   Neurological: Negative for weakness.   Psychiatric/Behavioral: Negative.        Physical Exam   BP: 143/89  Pulse: 118  Temp: 101  F (38.3  C)  Resp: 18  SpO2: 98 %      Physical Exam   Constitutional: She is oriented to person, place, and time. She appears well-developed and well-nourished. No distress.   HENT:   Head: Normocephalic.   Right Ear: External ear normal.   Left Ear: External ear normal.   Mouth/Throat: Oropharyngeal exudate present.   Posterior oropharynx with erythema and exudate. Tonsils +3. Uvula midline. Petichae on soft palate. NO PTA.    Neck: Normal range of motion. Neck supple.   Cardiovascular: Normal rate, regular rhythm and normal heart sounds.    No murmur heard.  Pulmonary/Chest: Effort normal. No respiratory distress. She has no wheezes. She has no rales.   Abdominal: Soft. She exhibits no distension.   Musculoskeletal: Normal range of motion.   Lymphadenopathy:     She has cervical adenopathy.   Neurological: She is alert and oriented to person, place, and time. She exhibits normal muscle tone.   Skin: Skin is warm and dry. No rash noted. She is not diaphoretic.   Psychiatric:  She has a normal mood and affect. Her behavior is normal.   Nursing note and vitals reviewed.      ED Course     ED Course     Procedures    Results for orders placed or performed during the hospital encounter of 08/06/18   Rapid strep screen   Result Value Ref Range    Specimen Description Throat     Rapid Strep A Screen (A)      POSITIVE: Group A Streptococcal antigen detected by immunoassay.  CORRECTED ON 08/06 AT 2011: PREVIOUSLY REPORTED AS NEGATIVE: No Group A streptococcal   antigen detected by immunoassay, await culture report.     Beta strep group A culture   Result Value Ref Range    Specimen Description Throat     Culture Micro Canceled, Test credited     Culture Micro TEST WAS ACCIDENTLY ORDERED. POSITIVE STREP      Medications   acetaminophen (TYLENOL) tablet 975 mg (975 mg Oral Given 8/6/18 1949)   ibuprofen (ADVIL/MOTRIN) tablet 800 mg (800 mg Oral Given 8/6/18 1950)   penicillin G benzathine (BICILLIN L-A) injection 1.2 Million Units (1.2 Million Units Intramuscular Given 8/6/18 2019)   dexamethasone (DECADRON) oral solution (inj used orally) 10 mg (10 mg Oral Given 8/6/18 2018)     Monitored for 20 minutes and tolerated well.     Assessments & Plan (with Medical Decision Making)     Discussed plan of care. She verbalized understanding. All questions answered.     I have reviewed the nursing notes.    I have reviewed the findings, diagnosis, plan and need for follow up with the patient.  Discharged in stable condition.     New Prescriptions    No medications on file       Final diagnoses:   Strep throat     Take tylenol and/or ibuprofen for pain. Follow dosing on package.   Increase water intake.   Throw tooth brush out in 5 days.   Follow up with any hot potato voice, trouble swallowing, or any increase in symptoms or concerns.   Follow up with PCP with any increase in symptoms or concerns.   Return to urgent care or emergency department with any increase in symptoms or concerns.     Melida  BLAINE Ovalle  8/6/2018  7:46 PM  [facility]       Melida Gary NP  08/06/18 2113       Melida Gary NP  08/06/18 8022

## 2018-11-22 NOTE — ED NOTES
Report to Larissa BRIAN.   Sudden onset RLQ abdominal pain with vomiting started at 10pm. Took tylenol when it started. Denies fevers or previous abdominal surgeries.

## 2019-09-06 ENCOUNTER — HOSPITAL ENCOUNTER (EMERGENCY)
Facility: HOSPITAL | Age: 24
Discharge: HOME | End: 2019-09-06
Attending: PHYSICIAN ASSISTANT | Admitting: PHYSICIAN ASSISTANT

## 2019-09-06 VITALS
DIASTOLIC BLOOD PRESSURE: 89 MMHG | TEMPERATURE: 98 F | HEART RATE: 117 BPM | RESPIRATION RATE: 16 BRPM | OXYGEN SATURATION: 98 % | SYSTOLIC BLOOD PRESSURE: 132 MMHG

## 2019-09-06 DIAGNOSIS — H10.32 ACUTE CONJUNCTIVITIS OF LEFT EYE, UNSPECIFIED ACUTE CONJUNCTIVITIS TYPE: ICD-10-CM

## 2019-09-06 PROCEDURE — 25000125 ZZHC RX 250: Performed by: PHYSICIAN ASSISTANT

## 2019-09-06 PROCEDURE — G0463 HOSPITAL OUTPT CLINIC VISIT: HCPCS

## 2019-09-06 PROCEDURE — 99212 OFFICE O/P EST SF 10 MIN: CPT | Mod: Z6 | Performed by: PHYSICIAN ASSISTANT

## 2019-09-06 RX ORDER — POLYMYXIN B SULFATE AND TRIMETHOPRIM 1; 10000 MG/ML; [USP'U]/ML
1 SOLUTION OPHTHALMIC EVERY 4 HOURS
Qty: 3 ML | Refills: 0 | Status: SHIPPED | OUTPATIENT
Start: 2019-09-06 | End: 2019-11-14

## 2019-09-06 RX ORDER — TETRACAINE HYDROCHLORIDE 5 MG/ML
2 SOLUTION OPHTHALMIC ONCE
Status: COMPLETED | OUTPATIENT
Start: 2019-09-06 | End: 2019-09-06

## 2019-09-06 RX ADMIN — TETRACAINE HYDROCHLORIDE 2 DROP: 5 SOLUTION OPHTHALMIC at 12:24

## 2019-09-06 NOTE — ED AVS SNAPSHOT
[facility]  [address] [address]  Phone:  [phone]                                    Reshma Martinez   MRN: [id number]    Department:  [facility]   Date of Visit:  9/6/2019           After Visit Summary Signature Page    I have received my discharge instructions, and my questions have been answered. I have discussed any challenges I see with this plan with the nurse or doctor.    ..........................................................................................................................................  Patient/Patient Representative Signature      ..........................................................................................................................................  Patient Representative Print Name and Relationship to Patient    ..................................................               ................................................  Date                                   Time    ..........................................................................................................................................  Reviewed by Signature/Title    ...................................................              ..............................................  Date                                               Time          22EPIC Rev 08/18

## 2019-09-06 NOTE — ED PROVIDER NOTES
History     Chief Complaint   Patient presents with     Conjunctivitis     HPI  Reshma Martinez is a 24 year old female who presents with complaints of left eye redness and discharge that started yesterday with significant eye pain and blurred vision.  Feels like she has something in her eye.  Has had no cold symptoms.  Has been treating with OTC drops with no relief.  Patient does not wear contacts.      Allergies:  Allergies   Allergen Reactions     Codeine Phosphate Other (See Comments)     Vomiting (Tylenol #3)         Problem List:    Patient Active Problem List    Diagnosis Date Noted     Alcohol abuse 09/11/2017     Priority: Medium     Marijuana use 09/11/2017     Priority: Medium     Insufficient endocervical or transformation zone component in cervical specimen 07/05/2016     Priority: Medium     NO SHOW 10/26/2015     Priority: Medium     No showed Dr. Ludwig  No showed Dr. Ludwig 9/28/16  No showed Dr. Ludwig 7/12/17       Alopecia 09/16/2015     Priority: Medium     Ovarian mass 09/16/2015     Priority: Medium     Ecchymosis 08/25/2015     Priority: Medium     Easy bruisability 08/25/2015     Priority: Medium     Abdominal pain, generalized 08/25/2015     Priority: Medium     Tobacco abuse 08/25/2015     Priority: Medium     Chest pain 08/25/2015     Priority: Medium     Encounter for routine gynecological examination 04/29/2015     Priority: Medium     Problem list name updated by automated process. Provider to review       Vaginal spotting 08/27/2014     Priority: Medium     Family planning 03/12/2014     Priority: Medium     nexplanon       Status post vaginal delivery 02/21/2014     Priority: Medium     Oziel. Rt labial lac un 5#7       Acute stress reaction 09/04/2013     Priority: Medium     Need for HPV vaccination 09/04/2013     Priority: Medium     Needs Gardasil #3 postpartum       Outbursts of anger 09/04/2013     Priority: Medium     Need for vaccination 09/04/2013     Priority: Medium      Gardasil post partum  Problem list name updated by automated process. Provider to review       Positive urine drug screen 08/08/2013     Priority: Medium     Confirmed + THC. Went into rehab and states is clean now 1/7/14       Multiple body piercings 07/31/2013     Priority: Medium     Tobacco abuse 10/02/2012     Priority: Medium     Attention deficit disorder 11/21/2011     Priority: Medium     Problem list name updated by automated process. 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     Tobacco abuse 10/02/2012       Social History:  Marital Status:  Single [1]  Social History     Tobacco Use     Smoking status: Current Every Day Smoker     Packs/day: 1.00     Years: 10.00     Pack years: 10.00     Types: Cigarettes     Smokeless tobacco: Never Used     Tobacco comment: pt denied quit plan 3/23/18   Substance Use Topics     Alcohol use: Yes     Comment: rarely     Drug use: Yes     Frequency: 7.0 times per week     Types: Marijuana     Comment:  Marjuana  daily        Medications:      trimethoprim-polymyxin b (POLYTRIM) 13544-7.1 UNIT/ML-% ophthalmic solution   diphenhydrAMINE (BENADRYL) 25 MG tablet         Review of Systems :  HENT: Negative for nasal congestion and coryza.   Eyes: Positive for eye redness, discharge and pain.         Physical Exam   BP: 132/89  Pulse: 117  Temp: 98  F (36.7  C)  Resp: 16  SpO2: 98 %      Physical Exam    Constitutional: Well-developed and well-nourished.   Head: Normocephalic and atraumatic.   Eyes: Left eye moderately injected conjunctiva with moderate discharge.  EOM are normal.  TRENT.  Eyelids everted and no evidence of FB.    Ears: TMs normal bilaterally  Nose: Congested with no sinus tenderness  Neck: Normal range of motion.   Pulmonary/Chest: Effort normal.  Skin: Skin is warm and dry. No rash noted.     ED Course               No results found for this or any previous visit (from the past 24 hour(s)).    Medications   tetracaine (PONTOCAINE) 0.5 % ophthalmic solution 2 drop (has no administration in time range)       Assessments & Plan (with Medical Decision Making)     I have reviewed the nursing notes.    I have reviewed the findings, diagnosis, plan and need for follow up with the patient.  Patient given 2 drops tetracaine for relief of discomfort.  Ibuprofen or Tylenol for pain relief.       Medication List      Started    trimethoprim-polymyxin b 64266-5.1 UNIT/ML-% ophthalmic solution  Commonly known as:  POLYTRIM  1 drop, Left Eye, EVERY 4 HOURS            Final diagnoses:   Acute conjunctivitis of left eye, unspecified acute conjunctivitis type       PEGGY Ramos on 9/6/2019 at 12:22 PM   9/6/2019   [facility]          Baron Lindsey PA  09/06/19 0539

## 2019-11-14 ENCOUNTER — HOSPITAL ENCOUNTER (EMERGENCY)
Facility: HOSPITAL | Age: 24
Discharge: HOME | End: 2019-11-14
Attending: NURSE PRACTITIONER | Admitting: NURSE PRACTITIONER

## 2019-11-14 ENCOUNTER — APPOINTMENT (OUTPATIENT)
Dept: GENERAL RADIOLOGY | Facility: HOSPITAL | Age: 24
End: 2019-11-14
Attending: NURSE PRACTITIONER

## 2019-11-14 VITALS
OXYGEN SATURATION: 99 % | DIASTOLIC BLOOD PRESSURE: 88 MMHG | SYSTOLIC BLOOD PRESSURE: 150 MMHG | TEMPERATURE: 96.4 F | RESPIRATION RATE: 18 BRPM

## 2019-11-14 DIAGNOSIS — R07.89 MUSCULAR CHEST PAIN: Primary | ICD-10-CM

## 2019-11-14 PROCEDURE — 96372 THER/PROPH/DIAG INJ SC/IM: CPT

## 2019-11-14 PROCEDURE — G0463 HOSPITAL OUTPT CLINIC VISIT: HCPCS | Mod: 25

## 2019-11-14 PROCEDURE — 71046 X-RAY EXAM CHEST 2 VIEWS: CPT | Mod: TC

## 2019-11-14 PROCEDURE — 25000128 H RX IP 250 OP 636: Performed by: NURSE PRACTITIONER

## 2019-11-14 PROCEDURE — 99213 OFFICE O/P EST LOW 20 MIN: CPT | Mod: Z6 | Performed by: NURSE PRACTITIONER

## 2019-11-14 RX ORDER — NAPROXEN 500 MG/1
500 TABLET ORAL 2 TIMES DAILY WITH MEALS
Qty: 30 TABLET | Refills: 0 | Status: SHIPPED | OUTPATIENT
Start: 2019-11-14 | End: 2019-12-22

## 2019-11-14 RX ORDER — KETOROLAC TROMETHAMINE 30 MG/ML
60 INJECTION, SOLUTION INTRAMUSCULAR; INTRAVENOUS ONCE
Status: COMPLETED | OUTPATIENT
Start: 2019-11-14 | End: 2019-11-14

## 2019-11-14 RX ADMIN — KETOROLAC TROMETHAMINE 60 MG: 30 INJECTION, SOLUTION INTRAMUSCULAR at 09:36

## 2019-11-14 ASSESSMENT — ENCOUNTER SYMPTOMS
VOMITING: 0
MYALGIAS: 1
NECK PAIN: 1
COUGH: 1
NAUSEA: 0
FEVER: 0

## 2019-11-14 NOTE — ED PROVIDER NOTES
"  History     Chief Complaint   Patient presents with     Chest Wall Pain     HPI  Reshma Martinez is a 24 year old female who presents with dad to  for left chest pain. Onset 4 days ago. States pain is sharp and radiates up to her neck. Denies injury, sleeping in a different position or heavy lifting. Movement and turning head to the right makes the pain worse. States inability to take deep breaths. Denies nausea, vomiting, fever, chills. She has a chronic \"smoker's\" cough. Eating and drinking okay. No numbness or tingling. States left side feels weak because of the pain. She has tried ice and heat. Heat helps with the pain. States does not like ibuprofen so has not taken it - because she does not like taking medication.     Allergies:  Allergies   Allergen Reactions     Codeine Phosphate Other (See Comments)     Vomiting (Tylenol #3)         Problem List:    Patient Active Problem List    Diagnosis Date Noted     Alcohol abuse 09/11/2017     Priority: Medium     Marijuana use 09/11/2017     Priority: Medium     Insufficient endocervical or transformation zone component in cervical specimen 07/05/2016     Priority: Medium     NO SHOW 10/26/2015     Priority: Medium     No showed Dr. Ludwig  No showed Dr. Ludwig 9/28/16  No showed Dr. Ludwig 7/12/17       Alopecia 09/16/2015     Priority: Medium     Ovarian mass 09/16/2015     Priority: Medium     Ecchymosis 08/25/2015     Priority: Medium     Easy bruisability 08/25/2015     Priority: Medium     Abdominal pain, generalized 08/25/2015     Priority: Medium     Tobacco abuse 08/25/2015     Priority: Medium     Chest pain 08/25/2015     Priority: Medium     Encounter for routine gynecological examination 04/29/2015     Priority: Medium     Problem list name updated by automated process. Provider to review       Vaginal spotting 08/27/2014     Priority: Medium     Family planning 03/12/2014     Priority: Medium     nexplanon       Status post vaginal delivery 02/21/2014 "     Priority: Medium     Ludwig. Rt labial lac un 5#7       Acute stress reaction 09/04/2013     Priority: Medium     Need for HPV vaccination 09/04/2013     Priority: Medium     Needs Gardasil #3 postpartum       Outbursts of anger 09/04/2013     Priority: Medium     Need for vaccination 09/04/2013     Priority: Medium     Gardasil post partum  Problem list name updated by automated process. Provider to review       Positive urine drug screen 08/08/2013     Priority: Medium     Confirmed + THC. Went into rehab and states is clean now 1/7/14       Multiple body piercings 07/31/2013     Priority: Medium     Tobacco abuse 10/02/2012     Priority: Medium     Attention deficit disorder 11/21/2011     Priority: Medium     Problem list name updated by automated process. 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     Tobacco abuse 10/02/2012       Past Surgical History:    Past Surgical History:   Procedure Laterality Date     C OPEN RX ANKLE DISLOCATN+FIXATN Right 03/24/2018    closed bimalleolar fracture     DILATION AND CURETTAGE, HYSTEROSCOPY DIAGNOSTIC, COMBINED  4/11/2014    Procedure: DILATION AND CURETTAGE, HYSTEROSCOPY;  Surgeon: Horace Ledbetter MD;  Location: [facility]     ORIF hand  2012    right     removal of hardware hand  2012    right       Family History:    Family History   Problem Relation Age of Onset     Cancer Maternal Grandfather          "cause of death     Other - See Comments Maternal Grandfather         leukemia     Other - See Comments Other         psychiatric illness; family h/o     Other - See Comments Mother         Going through tests at [place]     Other - See Comments Father         Heart Disease       Social History:  Marital Status:  Single [1]  Social History     Tobacco Use     Smoking status: Current Every Day Smoker     Packs/day: 1.00     Years: 10.00     Pack years: 10.00     Types: Cigarettes     Smokeless tobacco: Never Used     Tobacco comment: pt denied quit plan 3/23/18   Substance Use Topics     Alcohol use: Yes     Comment: rarely     Drug use: Yes     Frequency: 7.0 times per week     Types: Marijuana     Comment:  Marjuana  daily        Medications:    naproxen (NAPROSYN) 500 MG tablet          Review of Systems   Constitutional: Negative for fever.   Respiratory: Positive for cough (\"smoker's\").    Cardiovascular: Positive for chest pain.   Gastrointestinal: Negative for nausea and vomiting.   Musculoskeletal: Positive for myalgias and neck pain.   All other systems reviewed and are negative.      Physical Exam   BP: 150/88  Heart Rate: 104  Temp: 96.4  F (35.8  C)  Resp: 18  SpO2: 99 %      Physical Exam  Vitals signs and nursing note reviewed.   HENT:      Head: Normocephalic.      Right Ear: Tympanic membrane and ear canal normal.      Left Ear: Tympanic membrane and ear canal normal.   Neck:      Musculoskeletal: Neck supple. No muscular tenderness.   Cardiovascular:      Rate and Rhythm: Normal rate and regular rhythm.      Heart sounds: Normal heart sounds.   Pulmonary:      Effort: Pulmonary effort is normal.      Breath sounds: No wheezing, rhonchi or rales.   Chest:      Chest wall: Tenderness (left) present.      Comments: Left chest pain reproducible with movement of left arm and on palpation  Musculoskeletal:      Left shoulder: She exhibits tenderness (anterior) and pain. She exhibits no swelling and no " crepitus.   Skin:     General: Skin is warm and dry.   Neurological:      Mental Status: She is alert and oriented to person, place, and time.         ED Course        Procedures       Results for orders placed or performed during the hospital encounter of 11/14/19 (from the past 24 hour(s))   Chest XR,  PA & LAT    Narrative    PROCEDURE:  XR CHEST 2 VW    HISTORY:  left sided CP with dimished left upper lung sounds.     COMPARISON:  None.    FINDINGS:   The cardiac silhouette is normal in size. The pulmonary vasculature is  normal.  The lungs are clear. No pleural effusion or pneumothorax.      Impression    IMPRESSION:  No acute cardiopulmonary disease.      DEVIN DUMONT MD       Medications   ketorolac (TORADOL) injection 60 mg (60 mg Intramuscular Given 11/14/19 0936)       Assessments & Plan (with Medical Decision Making)   Muscular chest pain:  Patient presents with sided chest pain radiating up to the left side of her neck x4 days.  Pain is worse with movement of left arm and turning her head to the right.  Heart rate and rhythm regular.  Left upper lobe lung sounds slightly diminished, rest of the lung sounds are clear.  Chest wall tenderness reproducible with palpation and movement of left shoulder.  Anterior left shoulder pain.  No neck or spine tenderness to palpation.  Suspect this is a muscular chest pain.  Toradol IM given in urgent care.  Chest x-ray done due to diminished lung sounds, negative for acute cardiopulmonary disease.  Will prescribe naproxen.  Discussed with patient to continue applying heat and alternating with ice.  Take naproxen with food.  Continue using left shoulder as tolerated.  Follow-up with Dr. Reardon in a week if no improvement in symptoms.  Return to emergency department for worsening or concerning symptoms.  Patient verbalized understanding and agree with plan of care.    I have reviewed the nursing notes.    I have reviewed the findings, diagnosis, plan and need for  follow up with the patient.      Discharge Medication List as of 11/14/2019 10:16 AM      START taking these medications    Details   naproxen (NAPROSYN) 500 MG tablet Take 1 tablet (500 mg) by mouth 2 times daily (with meals), Disp-30 tablet, R-0, E-Prescribe             Final diagnoses:   Muscular chest pain       11/14/2019   [facility]     Mpofu, Prudence, CNP  11/14/19 1045

## 2019-11-14 NOTE — DISCHARGE INSTRUCTIONS
Continue applying heat and alternating with ice as needed, take naproxen as prescribed with food.    Follow-up with Dr. Reardon in 1 week if no improvement in symptoms.    Return to emergency department for worsening or concerning symptoms.

## 2019-11-14 NOTE — ED AVS SNAPSHOT
[facility]  [address] [address]  Phone:  [phone]                                    Reshma Martinez   MRN: [id number]    Department:  [facility]   Date of Visit:  11/14/2019           After Visit Summary Signature Page    I have received my discharge instructions, and my questions have been answered. I have discussed any challenges I see with this plan with the nurse or doctor.    ..........................................................................................................................................  Patient/Patient Representative Signature      ..........................................................................................................................................  Patient Representative Print Name and Relationship to Patient    ..................................................               ................................................  Date                                   Time    ..........................................................................................................................................  Reviewed by Signature/Title    ...................................................              ..............................................  Date                                               Time          22EPIC Rev 08/18

## 2019-11-14 NOTE — ED TRIAGE NOTES
"Patient presents to the emergency room with complaints of 4 days of left sided chest pain and left shoulder pain.  States that the pain is worse with cough/movement.  States nothing makes the pain better.  Patient noted to have a dry hacking cough in triage; states \"I smoke\" but the cough is worse.  "

## 2019-11-14 NOTE — ED NOTES
Patient discharged with understanding of instructions and recommendations.     Nydia Mcclain LPN on 11/14/2019 at 10:25 AM

## 2019-11-14 NOTE — ED TRIAGE NOTES
Patient presents today with c/o left sided chest pain along with left shoulder pain.  Pain radiates into left side of neck.    Ongoing for 4 days.  Also experiencing cough and ear pain left.  Trying heat and ice - no relief..

## 2019-12-22 ENCOUNTER — HOSPITAL ENCOUNTER (EMERGENCY)
Facility: HOSPITAL | Age: 24
Discharge: HOME | End: 2019-12-22
Attending: FAMILY MEDICINE | Admitting: FAMILY MEDICINE

## 2019-12-22 ENCOUNTER — APPOINTMENT (OUTPATIENT)
Dept: GENERAL RADIOLOGY | Facility: HOSPITAL | Age: 24
End: 2019-12-22
Attending: FAMILY MEDICINE

## 2019-12-22 VITALS
OXYGEN SATURATION: 97 % | TEMPERATURE: 99.5 F | HEART RATE: 104 BPM | RESPIRATION RATE: 21 BRPM | SYSTOLIC BLOOD PRESSURE: 127 MMHG | DIASTOLIC BLOOD PRESSURE: 86 MMHG

## 2019-12-22 DIAGNOSIS — F12.10 CANNABIS ABUSE: ICD-10-CM

## 2019-12-22 DIAGNOSIS — J10.1 INFLUENZA A: ICD-10-CM

## 2019-12-22 DIAGNOSIS — F15.10 METHAMPHETAMINE ABUSE (H): ICD-10-CM

## 2019-12-22 LAB
ALBUMIN SERPL-MCNC: 4 G/DL (ref 3.4–5)
ALBUMIN UR-MCNC: 10 MG/DL
ALP SERPL-CCNC: 72 U/L (ref 40–150)
ALT SERPL W P-5'-P-CCNC: 76 U/L (ref 0–50)
AMPHETAMINES UR QL: ABNORMAL NG/ML
ANION GAP SERPL CALCULATED.3IONS-SCNC: 10 MMOL/L (ref 3–14)
APPEARANCE UR: CLEAR
AST SERPL W P-5'-P-CCNC: 61 U/L (ref 0–45)
BACTERIA #/AREA URNS HPF: ABNORMAL /HPF
BARBITURATES UR QL SCN: NOT DETECTED NG/ML
BASOPHILS # BLD AUTO: 0 10E9/L (ref 0–0.2)
BASOPHILS NFR BLD AUTO: 0.2 %
BENZODIAZ UR QL SCN: ABNORMAL NG/ML
BILIRUB SERPL-MCNC: 0.4 MG/DL (ref 0.2–1.3)
BILIRUB UR QL STRIP: NEGATIVE
BUN SERPL-MCNC: 5 MG/DL (ref 7–30)
BUPRENORPHINE UR QL: NOT DETECTED NG/ML
CALCIUM SERPL-MCNC: 8.8 MG/DL (ref 8.5–10.1)
CANNABINOIDS UR QL: ABNORMAL NG/ML
CHLORIDE SERPL-SCNC: 100 MMOL/L (ref 94–109)
CO2 SERPL-SCNC: 25 MMOL/L (ref 20–32)
COCAINE UR QL SCN: NOT DETECTED NG/ML
COLOR UR AUTO: YELLOW
CREAT SERPL-MCNC: 0.67 MG/DL (ref 0.52–1.04)
CRP SERPL-MCNC: 3.4 MG/L (ref 0–8)
D-METHAMPHET UR QL: ABNORMAL NG/ML
DIFFERENTIAL METHOD BLD: ABNORMAL
EOSINOPHIL # BLD AUTO: 0 10E9/L (ref 0–0.7)
EOSINOPHIL NFR BLD AUTO: 0 %
ERYTHROCYTE [DISTWIDTH] IN BLOOD BY AUTOMATED COUNT: 11.8 % (ref 10–15)
FLUAV+FLUBV RNA SPEC QL NAA+PROBE: NEGATIVE
FLUAV+FLUBV RNA SPEC QL NAA+PROBE: POSITIVE
GFR SERPL CREATININE-BSD FRML MDRD: >90 ML/MIN/{1.73_M2}
GLUCOSE SERPL-MCNC: 133 MG/DL (ref 70–99)
GLUCOSE UR STRIP-MCNC: NEGATIVE MG/DL
HCG UR QL: NEGATIVE
HCT VFR BLD AUTO: 40.9 % (ref 35–47)
HETEROPH AB SER QL: NEGATIVE
HGB BLD-MCNC: 14.5 G/DL (ref 11.7–15.7)
HGB UR QL STRIP: ABNORMAL
IMM GRANULOCYTES # BLD: 0 10E9/L (ref 0–0.4)
IMM GRANULOCYTES NFR BLD: 0.4 %
KETONES UR STRIP-MCNC: 40 MG/DL
LACTATE BLD-SCNC: 1.4 MMOL/L (ref 0.7–2)
LEUKOCYTE ESTERASE UR QL STRIP: NEGATIVE
LYMPHOCYTES # BLD AUTO: 0.5 10E9/L (ref 0.8–5.3)
LYMPHOCYTES NFR BLD AUTO: 8.5 %
MCH RBC QN AUTO: 33.3 PG (ref 26.5–33)
MCHC RBC AUTO-ENTMCNC: 35.5 G/DL (ref 31.5–36.5)
MCV RBC AUTO: 94 FL (ref 78–100)
METHADONE UR QL SCN: NOT DETECTED NG/ML
MONOCYTES # BLD AUTO: 0.5 10E9/L (ref 0–1.3)
MONOCYTES NFR BLD AUTO: 9.1 %
MUCOUS THREADS #/AREA URNS LPF: PRESENT /LPF
NEUTROPHILS # BLD AUTO: 4.3 10E9/L (ref 1.6–8.3)
NEUTROPHILS NFR BLD AUTO: 81.8 %
NITRATE UR QL: NEGATIVE
NRBC # BLD AUTO: 0 10*3/UL
NRBC BLD AUTO-RTO: 0 /100
OPIATES UR QL SCN: NOT DETECTED NG/ML
OXYCODONE UR QL SCN: NOT DETECTED NG/ML
PCP UR QL SCN: NOT DETECTED NG/ML
PH UR STRIP: 6 PH (ref 4.7–8)
PLATELET # BLD AUTO: 132 10E9/L (ref 150–450)
POTASSIUM SERPL-SCNC: 2.9 MMOL/L (ref 3.4–5.3)
PROPOXYPH UR QL: NOT DETECTED NG/ML
PROT SERPL-MCNC: 7.8 G/DL (ref 6.8–8.8)
RBC # BLD AUTO: 4.36 10E12/L (ref 3.8–5.2)
RBC #/AREA URNS AUTO: 2 /HPF (ref 0–2)
RSV RNA SPEC NAA+PROBE: NEGATIVE
SODIUM SERPL-SCNC: 135 MMOL/L (ref 133–144)
SOURCE: ABNORMAL
SP GR UR STRIP: 1.03 (ref 1–1.03)
SPECIMEN SOURCE: ABNORMAL
SQUAMOUS #/AREA URNS AUTO: 10 /HPF (ref 0–1)
TRICYCLICS UR QL SCN: NOT DETECTED NG/ML
UROBILINOGEN UR STRIP-MCNC: 2 MG/DL (ref 0–2)
WBC # BLD AUTO: 5.3 10E9/L (ref 4–11)
WBC #/AREA URNS AUTO: 4 /HPF (ref 0–5)

## 2019-12-22 PROCEDURE — 25000128 H RX IP 250 OP 636: Performed by: FAMILY MEDICINE

## 2019-12-22 PROCEDURE — 83605 ASSAY OF LACTIC ACID: CPT | Performed by: FAMILY MEDICINE

## 2019-12-22 PROCEDURE — 86308 HETEROPHILE ANTIBODY SCREEN: CPT | Performed by: FAMILY MEDICINE

## 2019-12-22 PROCEDURE — 96361 HYDRATE IV INFUSION ADD-ON: CPT

## 2019-12-22 PROCEDURE — 85025 COMPLETE CBC W/AUTO DIFF WBC: CPT | Performed by: FAMILY MEDICINE

## 2019-12-22 PROCEDURE — 25000132 ZZH RX MED GY IP 250 OP 250 PS 637: Performed by: FAMILY MEDICINE

## 2019-12-22 PROCEDURE — 81001 URINALYSIS AUTO W/SCOPE: CPT | Performed by: FAMILY MEDICINE

## 2019-12-22 PROCEDURE — 87040 BLOOD CULTURE FOR BACTERIA: CPT | Performed by: FAMILY MEDICINE

## 2019-12-22 PROCEDURE — 87631 RESP VIRUS 3-5 TARGETS: CPT | Performed by: FAMILY MEDICINE

## 2019-12-22 PROCEDURE — 81025 URINE PREGNANCY TEST: CPT | Performed by: FAMILY MEDICINE

## 2019-12-22 PROCEDURE — 80306 DRUG TEST PRSMV INSTRMNT: CPT | Performed by: FAMILY MEDICINE

## 2019-12-22 PROCEDURE — 71045 X-RAY EXAM CHEST 1 VIEW: CPT | Mod: TC

## 2019-12-22 PROCEDURE — 80053 COMPREHEN METABOLIC PANEL: CPT | Performed by: FAMILY MEDICINE

## 2019-12-22 PROCEDURE — 25800030 ZZH RX IP 258 OP 636: Performed by: FAMILY MEDICINE

## 2019-12-22 PROCEDURE — 36415 COLL VENOUS BLD VENIPUNCTURE: CPT | Performed by: FAMILY MEDICINE

## 2019-12-22 PROCEDURE — 86140 C-REACTIVE PROTEIN: CPT | Performed by: FAMILY MEDICINE

## 2019-12-22 PROCEDURE — 99284 EMERGENCY DEPT VISIT MOD MDM: CPT | Mod: 25

## 2019-12-22 PROCEDURE — 99284 EMERGENCY DEPT VISIT MOD MDM: CPT | Mod: Z6 | Performed by: FAMILY MEDICINE

## 2019-12-22 PROCEDURE — 96374 THER/PROPH/DIAG INJ IV PUSH: CPT

## 2019-12-22 RX ORDER — OSELTAMIVIR PHOSPHATE 75 MG/1
75 CAPSULE ORAL 2 TIMES DAILY
Qty: 10 CAPSULE | Refills: 0 | Status: SHIPPED | OUTPATIENT
Start: 2019-12-22 | End: 2019-12-27

## 2019-12-22 RX ORDER — SODIUM CHLORIDE 9 MG/ML
1000 INJECTION, SOLUTION INTRAVENOUS CONTINUOUS
Status: DISCONTINUED | OUTPATIENT
Start: 2019-12-22 | End: 2019-12-22 | Stop reason: HOSPADM

## 2019-12-22 RX ORDER — ACETAMINOPHEN 325 MG/1
650 TABLET ORAL ONCE
Status: COMPLETED | OUTPATIENT
Start: 2019-12-22 | End: 2019-12-22

## 2019-12-22 RX ORDER — POTASSIUM CHLORIDE 1500 MG/1
40 TABLET, EXTENDED RELEASE ORAL ONCE
Status: COMPLETED | OUTPATIENT
Start: 2019-12-22 | End: 2019-12-22

## 2019-12-22 RX ORDER — OSELTAMIVIR PHOSPHATE 75 MG/1
75 CAPSULE ORAL ONCE
Status: COMPLETED | OUTPATIENT
Start: 2019-12-22 | End: 2019-12-22

## 2019-12-22 RX ORDER — ONDANSETRON 2 MG/ML
4 INJECTION INTRAMUSCULAR; INTRAVENOUS ONCE
Status: COMPLETED | OUTPATIENT
Start: 2019-12-22 | End: 2019-12-22

## 2019-12-22 RX ADMIN — OSELTAMIVIR 75 MG: 75 CAPSULE ORAL at 02:42

## 2019-12-22 RX ADMIN — POTASSIUM CHLORIDE 40 MEQ: 1500 TABLET, EXTENDED RELEASE ORAL at 01:44

## 2019-12-22 RX ADMIN — ONDANSETRON 4 MG: 2 INJECTION INTRAMUSCULAR; INTRAVENOUS at 01:19

## 2019-12-22 RX ADMIN — ACETAMINOPHEN 650 MG: 325 TABLET, FILM COATED ORAL at 01:18

## 2019-12-22 RX ADMIN — SODIUM CHLORIDE 1000 ML: 9 INJECTION, SOLUTION INTRAVENOUS at 01:18

## 2019-12-22 ASSESSMENT — ENCOUNTER SYMPTOMS
HEMATOLOGIC/LYMPHATIC NEGATIVE: 1
PSYCHIATRIC NEGATIVE: 1
EYES NEGATIVE: 1
FEVER: 1
ARTHRALGIAS: 0
ALLERGIC/IMMUNOLOGIC NEGATIVE: 1
DIFFICULTY URINATING: 0
SHORTNESS OF BREATH: 0
ENDOCRINE NEGATIVE: 1
CARDIOVASCULAR NEGATIVE: 1
NEUROLOGICAL NEGATIVE: 1
NECK STIFFNESS: 0
GASTROINTESTINAL NEGATIVE: 1
CONFUSION: 0
RESPIRATORY NEGATIVE: 1
SORE THROAT: 1
MUSCULOSKELETAL NEGATIVE: 1
ABDOMINAL PAIN: 0
COLOR CHANGE: 0
EYE REDNESS: 0
HEADACHES: 0

## 2019-12-22 NOTE — ED NOTES
"Patient ambulatory to ED room 2 with friend accompanying. Patient states that she developed pharyngitis, otalgia, and body aches over the last 24 hours. Patient states \"I just don't feel good\". Patient into bed. Dr. Lopez in to see patient and per MD, no strep swab at this time.  "

## 2019-12-22 NOTE — ED AVS SNAPSHOT
[facility]  [address] [address]  Phone:  [phone]                                    Rehsma Martinez   MRN: [id number]    Department:  [facility]   Date of Visit:  12/22/2019           After Visit Summary Signature Page    I have received my discharge instructions, and my questions have been answered. I have discussed any challenges I see with this plan with the nurse or doctor.    ..........................................................................................................................................  Patient/Patient Representative Signature      ..........................................................................................................................................  Patient Representative Print Name and Relationship to Patient    ..................................................               ................................................  Date                                   Time    ..........................................................................................................................................  Reviewed by Signature/Title    ...................................................              ..............................................  Date                                               Time          22EPIC Rev 08/18

## 2019-12-22 NOTE — ED PROVIDER NOTES
History     Chief Complaint   Patient presents with     Otalgia     Pharyngitis     HPI  Reshma Martinez is a 24 year old female who [facility] emergency room with a friend complaining of sore throat, earache, body aches over the last 24 hours.  She states she does not feel well.  She really does not take anything for this.  She has had strep before.  She does smoke.  Currently menstruating.  One pregnancy and one child.  We will give her a liter of normal saline IV, Zofran, Tylenol.    Allergies:  Allergies   Allergen Reactions     Codeine Phosphate Other (See Comments)     Vomiting (Tylenol #3)         Problem List:    Patient Active Problem List    Diagnosis Date Noted     Alcohol abuse 09/11/2017     Priority: Medium     Marijuana use 09/11/2017     Priority: Medium     Insufficient endocervical or transformation zone component in cervical specimen 07/05/2016     Priority: Medium     NO SHOW 10/26/2015     Priority: Medium     No showed Dr. Ludwig  No showed Dr. Ludwig 9/28/16  No showed Dr. Ludwig 7/12/17       Alopecia 09/16/2015     Priority: Medium     Ovarian mass 09/16/2015     Priority: Medium     Ecchymosis 08/25/2015     Priority: Medium     Easy bruisability 08/25/2015     Priority: Medium     Abdominal pain, generalized 08/25/2015     Priority: Medium     Tobacco abuse 08/25/2015     Priority: Medium     Chest pain 08/25/2015     Priority: Medium     Encounter for routine gynecological examination 04/29/2015     Priority: Medium     Problem list name updated by automated process. Provider to review       Vaginal spotting 08/27/2014     Priority: Medium     Family planning 03/12/2014     Priority: Medium     nexplanon       Status post vaginal delivery 02/21/2014     Priority: Medium     Oziel. Rt labial lac un 5#7       Acute stress reaction 09/04/2013     Priority: Medium     Need for HPV vaccination 09/04/2013     Priority: Medium     Needs Gardasil #3 postpartum       Outbursts of anger 09/04/2013      Priority: Medium     Need for vaccination 09/04/2013     Priority: Medium     Gardasil post partum  Problem list name updated by automated process. Provider to review       Positive urine drug screen 08/08/2013     Priority: Medium     Confirmed + THC. Went into rehab and states is clean now 1/7/14       Multiple body piercings 07/31/2013     Priority: Medium     Tobacco abuse 10/02/2012     Priority: Medium     Attention deficit disorder 11/21/2011     Priority: Medium     Problem list name updated by automated process. 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     Tobacco abuse 10/02/2012       Past Surgical History:    Past Surgical History:   Procedure Laterality Date     C OPEN RX ANKLE DISLOCATN+FIXATN Right 03/24/2018    closed bimalleolar fracture     DILATION AND CURETTAGE, HYSTEROSCOPY DIAGNOSTIC, COMBINED  4/11/2014    Procedure: DILATION AND CURETTAGE, HYSTEROSCOPY;  Surgeon: Horace Ledbetter MD;  Location: [facility]     ORIF hand  2012    right     removal of hardware hand  2012    right       Family History:    Family History   Problem Relation Age of Onset     Cancer Maternal Grandfather         cause of death     Other - See Comments Maternal Grandfather         leukemia     Other - See Comments Other         psychiatric illness; family h/o     Other - See Comments Mother         Going through tests at [facility]     Other - See Comments Father          Heart Disease       Social History:  Marital Status:  Single [1]  Social History     Tobacco Use     Smoking status: Current Every Day Smoker     Packs/day: 1.00     Years: 10.00     Pack years: 10.00     Types: Cigarettes     Smokeless tobacco: Never Used     Tobacco comment: pt denied quit plan 3/23/18   Substance Use Topics     Alcohol use: Yes     Comment: rarely     Drug use: Yes     Frequency: 7.0 times per week     Types: Marijuana     Comment:  Marjuana  daily        Medications:    oseltamivir (TAMIFLU) 75 MG capsule          Review of Systems   Constitutional: Positive for fever.        Body aches   HENT: Positive for ear pain and sore throat. Negative for congestion.    Eyes: Negative.  Negative for redness.   Respiratory: Negative.  Negative for shortness of breath.    Cardiovascular: Negative.  Negative for chest pain.   Gastrointestinal: Negative.  Negative for abdominal pain.   Endocrine: Negative.    Genitourinary: Negative.  Negative for difficulty urinating.   Musculoskeletal: Negative.  Negative for arthralgias and neck stiffness.   Skin: Negative.  Negative for color change.   Allergic/Immunologic: Negative.    Neurological: Negative.  Negative for headaches.   Hematological: Negative.    Psychiatric/Behavioral: Negative.  Negative for confusion.   All other systems reviewed and are negative.      Physical Exam   BP: 133/91  Pulse: (!) 136  Heart Rate: 109  Temp: (!) 101.9  F (38.8  C)  Resp: 18  SpO2: 98 %      Physical Exam  Vitals signs and nursing note reviewed.   Constitutional:       General: She is in acute distress (Uncomfortable).      Appearance: She is well-developed and normal weight. She is not ill-appearing, toxic-appearing or diaphoretic.   HENT:      Head: Normocephalic and atraumatic.      Right Ear: Tympanic membrane and ear canal normal. No drainage, swelling or tenderness. No middle ear effusion. Tympanic membrane is not erythematous.      Left Ear: Ear canal normal.  Drainage, swelling and tenderness present. A middle ear effusion is present. Tympanic membrane is erythematous.      Nose: No congestion or rhinorrhea.      Mouth/Throat:      Mouth: Mucous membranes are dry. No oral lesions.      Pharynx: Oropharynx is clear. Posterior oropharyngeal erythema present. No pharyngeal swelling, oropharyngeal exudate or uvula swelling.      Tonsils: No tonsillar exudate or tonsillar abscesses.   Eyes:      Extraocular Movements:      Right eye: Normal extraocular motion.      Left eye: Normal extraocular motion.   Neck:      Musculoskeletal: Normal range of motion and neck supple.      Thyroid: No thyromegaly.   Cardiovascular:      Rate and Rhythm: Tachycardia present.      Heart sounds: Normal heart sounds. No murmur.   Pulmonary:      Effort: Pulmonary effort is normal. No respiratory distress.      Breath sounds: Normal breath sounds. No stridor. No wheezing, rhonchi or rales.   Chest:      Chest wall: No tenderness.   Abdominal:      Palpations: Abdomen is soft.      Tenderness: There is no abdominal tenderness.   Lymphadenopathy:      Cervical: No cervical adenopathy.   Skin:     General: Skin is warm and dry.      Capillary Refill: Capillary refill takes less than 2 seconds.      Coloration: Skin is not pale.      Findings: No erythema or rash.      Comments: Multiple tattoos   Neurological:      General: No focal deficit present.      Mental Status: She is alert and oriented to person, place, and time.   Psychiatric:         Mood and Affect: Mood normal.         Behavior: Behavior normal.         ED Course        Procedures               Critical Care time:  none  Patient has influenza A.  She has been given IV fluids.  We will start her on Tamiflu.  Awaiting drug screen.            Results for orders placed or performed during the hospital encounter of 12/22/19 (from the past 24 hour(s))   CBC with platelets differential   Result Value Ref Range    WBC 5.3 4.0 - 11.0 10e9/L     RBC Count 4.36 3.8 - 5.2 10e12/L    Hemoglobin 14.5 11.7 - 15.7 g/dL    Hematocrit 40.9 35.0 - 47.0 %    MCV 94 78 - 100 fl    MCH 33.3 (H) 26.5 - 33.0 pg    MCHC 35.5 31.5 - 36.5 g/dL    RDW 11.8 10.0 - 15.0 %    Platelet Count 132 (L) 150 - 450 10e9/L    Diff Method Automated Method     % Neutrophils 81.8 %    % Lymphocytes 8.5 %    % Monocytes 9.1 %    % Eosinophils 0.0 %    % Basophils 0.2 %    % Immature Granulocytes 0.4 %    Nucleated RBCs 0 0 /100    Absolute Neutrophil 4.3 1.6 - 8.3 10e9/L    Absolute Lymphocytes 0.5 (L) 0.8 - 5.3 10e9/L    Absolute Monocytes 0.5 0.0 - 1.3 10e9/L    Absolute Eosinophils 0.0 0.0 - 0.7 10e9/L    Absolute Basophils 0.0 0.0 - 0.2 10e9/L    Abs Immature Granulocytes 0.0 0 - 0.4 10e9/L    Absolute Nucleated RBC 0.0    Comprehensive metabolic panel   Result Value Ref Range    Sodium 135 133 - 144 mmol/L    Potassium 2.9 (LL) 3.4 - 5.3 mmol/L    Chloride 100 94 - 109 mmol/L    Carbon Dioxide 25 20 - 32 mmol/L    Anion Gap 10 3 - 14 mmol/L    Glucose 133 (H) 70 - 99 mg/dL    Urea Nitrogen 5 (L) 7 - 30 mg/dL    Creatinine 0.67 0.52 - 1.04 mg/dL    GFR Estimate >90 >60 mL/min/[1.73_m2]    GFR Estimate If Black >90 >60 mL/min/[1.73_m2]    Calcium 8.8 8.5 - 10.1 mg/dL    Bilirubin Total 0.4 0.2 - 1.3 mg/dL    Albumin 4.0 3.4 - 5.0 g/dL    Protein Total 7.8 6.8 - 8.8 g/dL    Alkaline Phosphatase 72 40 - 150 U/L    ALT 76 (H) 0 - 50 U/L    AST 61 (H) 0 - 45 U/L   Lactic acid whole blood   Result Value Ref Range    Lactic Acid 1.4 0.7 - 2.0 mmol/L   CRP inflammation   Result Value Ref Range    CRP Inflammation 3.4 0.0 - 8.0 mg/L   Influenza A and B and RSV PCR   Result Value Ref Range    Specimen Description Nasopharyngeal     Influenza A PCR Positive (A) NEG^Negative    Influenza B PCR Negative NEG^Negative    Resp Syncytial Virus Negative NEG^Negative   Mononucleosis screen   Result Value Ref Range    Mononucleosis Screen Negative NEG^Negative   UA with Microscopic   Result Value Ref  Range    Color Urine Yellow     Appearance Urine Clear     Glucose Urine Negative NEG^Negative mg/dL    Bilirubin Urine Negative NEG^Negative    Ketones Urine 40 (A) NEG^Negative mg/dL    Specific Gravity Urine 1.026 1.003 - 1.035    Blood Urine Small (A) NEG^Negative    pH Urine 6.0 4.7 - 8.0 pH    Protein Albumin Urine 10 (A) NEG^Negative mg/dL    Urobilinogen mg/dL 2.0 0.0 - 2.0 mg/dL    Nitrite Urine Negative NEG^Negative    Leukocyte Esterase Urine Negative NEG^Negative    Source Midstream Urine     WBC Urine 4 0 - 5 /HPF    RBC Urine 2 0 - 2 /HPF    Bacteria Urine None (A) NEG^Negative /HPF    Squamous Epithelial /HPF Urine 10 (H) 0 - 1 /HPF    Mucous Urine Present (A) NEG^Negative /LPF   Urine Drugs of Abuse Screen Panel 13   Result Value Ref Range    Cannabinoids (59-tqo-9-carboxy-9-THC) Detected, Abnormal Result (A) NDET^Not Detected ng/mL    Phencyclidine (Phencyclidine) Not Detected NDET^Not Detected ng/mL    Cocaine (Benzoylecgonine) Not Detected NDET^Not Detected ng/mL    Methamphetamine (d-Methamphetamine) Detected, Abnormal Result (A) NDET^Not Detected ng/mL    Opiates (Morphine) Not Detected NDET^Not Detected ng/mL    Amphetamine (d-Amphetamine) Detected, Abnormal Result (A) NDET^Not Detected ng/mL    Benzodiazepines (Nordiazepam) Detected, Abnormal Result (A) NDET^Not Detected ng/mL    Tricyclic Antidepressants (Desipramine) Not Detected NDET^Not Detected ng/mL    Methadone (Methadone) Not Detected NDET^Not Detected ng/mL    Barbiturates (Butalbital) Not Detected NDET^Not Detected ng/mL    Oxycodone (Oxycodone) Not Detected NDET^Not Detected ng/mL    Propoxyphene (Norpropoxyphene) Not Detected NDET^Not Detected ng/mL    Buprenorphine (Buprenorphine) Not Detected NDET^Not Detected ng/mL   HCG qualitative urine   Result Value Ref Range    HCG Qual Urine Negative NEG^Negative       Medications   0.9% sodium chloride BOLUS (1,000 mLs Intravenous New Bag 12/22/19 0118)     Followed by   sodium chloride  0.9% infusion (has no administration in time range)   oseltamivir (TAMIFLU) capsule 75 mg (has no administration in time range)   ondansetron (ZOFRAN) injection 4 mg (4 mg Intravenous Given 12/22/19 0119)   acetaminophen (TYLENOL) tablet 650 mg (650 mg Oral Given 12/22/19 0118)   potassium chloride ER (K-DUR/KLOR-CON M) CR tablet 40 mEq (40 mEq Oral Given 12/22/19 0144)       Assessments & Plan (with Medical Decision Making)     I have reviewed the nursing notes.    I have reviewed the findings, diagnosis, plan and need for follow up with the patient.    Patient is probably in some withdrawal from meth use.  She does not feel well but she is medically stable.  Plan is to discharge her home.  Recommend smoking cessation and cessation of drug abuse.    New Prescriptions    OSELTAMIVIR (TAMIFLU) 75 MG CAPSULE    Take 1 capsule (75 mg) by mouth 2 times daily for 5 days       Final diagnoses:   Influenza A   Methamphetamine abuse (H)   Cannabis abuse       12/22/2019   [facility]     Aidan Lopez DO  12/22/19 0228

## 2019-12-22 NOTE — ED NOTES
Patient given written and verbal discharge instructions and patient verbalizes understanding. Patient given written prescription for tamiflu. Patient left ambulatory with friend.

## 2019-12-28 LAB
BACTERIA SPEC CULT: NORMAL
BACTERIA SPEC CULT: NORMAL
SPECIMEN SOURCE: NORMAL
SPECIMEN SOURCE: NORMAL

## 2020-02-10 ENCOUNTER — HEALTH MAINTENANCE LETTER (OUTPATIENT)
Age: 25
End: 2020-02-10

## 2020-06-24 ENCOUNTER — APPOINTMENT (OUTPATIENT)
Dept: CT IMAGING | Facility: CLINIC | Age: 25
End: 2020-06-24
Attending: EMERGENCY MEDICINE

## 2020-06-24 ENCOUNTER — HOSPITAL ENCOUNTER (EMERGENCY)
Facility: CLINIC | Age: 25
Discharge: HOME | End: 2020-06-24
Attending: EMERGENCY MEDICINE | Admitting: EMERGENCY MEDICINE

## 2020-06-24 VITALS
DIASTOLIC BLOOD PRESSURE: 79 MMHG | SYSTOLIC BLOOD PRESSURE: 109 MMHG | TEMPERATURE: 97.9 F | RESPIRATION RATE: 16 BRPM | WEIGHT: 130 LBS | OXYGEN SATURATION: 100 % | BODY MASS INDEX: 22.31 KG/M2

## 2020-06-24 DIAGNOSIS — S16.1XXA STRAIN OF NECK MUSCLE, INITIAL ENCOUNTER: ICD-10-CM

## 2020-06-24 DIAGNOSIS — F15.20 METHAMPHETAMINE USE DISORDER, MODERATE (H): ICD-10-CM

## 2020-06-24 DIAGNOSIS — S09.90XA CLOSED HEAD INJURY, INITIAL ENCOUNTER: ICD-10-CM

## 2020-06-24 DIAGNOSIS — F10.20 ALCOHOL USE DISORDER, SEVERE, DEPENDENCE (H): ICD-10-CM
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
## 2020-06-24 PROCEDURE — 70450 CT HEAD/BRAIN W/O DYE: CPT

## 2020-06-24 PROCEDURE — 99284 EMERGENCY DEPT VISIT MOD MDM: CPT | Mod: 25 | Performed by: EMERGENCY MEDICINE

## 2020-06-24 PROCEDURE — 72125 CT NECK SPINE W/O DYE: CPT

## 2020-06-24 PROCEDURE — 99284 EMERGENCY DEPT VISIT MOD MDM: CPT | Mod: Z6 | Performed by: EMERGENCY MEDICINE

## 2020-06-24 RX ORDER — IBUPROFEN 400 MG/1
800 TABLET, FILM COATED ORAL ONCE
Status: DISCONTINUED | OUTPATIENT
Start: 2020-06-24 | End: 2020-06-24 | Stop reason: HOSPADM

## 2020-06-24 RX ORDER — ACETAMINOPHEN 500 MG
1000 TABLET ORAL ONCE
Status: DISCONTINUED | OUTPATIENT
Start: 2020-06-24 | End: 2020-06-24 | Stop reason: HOSPADM

## 2020-06-24 NOTE — ED AVS SNAPSHOT
[facility]  [address] [facility] [facility] [address]  Phone:  [phone]  Fax:  [phone]                                    Reshma Martinez   MRN: [id number]    Department:  [facility]   Date of Visit:  6/24/2020           After Visit Summary Signature Page    I have received my discharge instructions, and my questions have been answered. I have discussed any challenges I see with this plan with the nurse or doctor.    ..........................................................................................................................................  Patient/Patient Representative Signature      ..........................................................................................................................................  Patient Representative Print Name and Relationship to Patient    ..................................................               ................................................  Date                                   Time    ..........................................................................................................................................  Reviewed by Signature/Title    ...................................................              ..............................................  Date                                               Time          22EPIC Rev 08/18
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
## 2020-06-24 NOTE — DISCHARGE INSTRUCTIONS
Stop drinking alcohol, stop using drugs    Go to NA get a sponsor do the steps    Follow-up for evaluation regarding alcohol and drug use, recommend inpatient treatment    With respect to head injury recheck if headache, vomiting, focal neurologic change  With respect to chest wall contusion recheck for shortness of air worsening pain or faintness

## 2020-11-14 ENCOUNTER — HEALTH MAINTENANCE LETTER (OUTPATIENT)
Age: 25
End: 2020-11-14

## 2020-12-01 ENCOUNTER — MYC MEDICAL ADVICE (OUTPATIENT)
Dept: FAMILY MEDICINE | Facility: OTHER | Age: 25
End: 2020-12-01

## 2020-12-07 ENCOUNTER — HOSPITAL ENCOUNTER (EMERGENCY)
Facility: HOSPITAL | Age: 25
Discharge: HOME | End: 2020-12-07
Attending: NURSE PRACTITIONER | Admitting: NURSE PRACTITIONER

## 2020-12-07 VITALS
DIASTOLIC BLOOD PRESSURE: 78 MMHG | HEART RATE: 111 BPM | TEMPERATURE: 98.9 F | OXYGEN SATURATION: 99 % | RESPIRATION RATE: 18 BRPM | SYSTOLIC BLOOD PRESSURE: 130 MMHG

## 2020-12-07 DIAGNOSIS — Z48.01 ENCOUNTER FOR CHANGE OR REMOVAL OF SURGICAL WOUND DRESSING: ICD-10-CM

## 2020-12-07 DIAGNOSIS — M79.662 PAIN OF LEFT LOWER LEG: Primary | ICD-10-CM

## 2020-12-07 PROCEDURE — 99214 OFFICE O/P EST MOD 30 MIN: CPT | Performed by: NURSE PRACTITIONER

## 2020-12-07 PROCEDURE — 250N000013 HC RX MED GY IP 250 OP 250 PS 637: Performed by: NURSE PRACTITIONER

## 2020-12-07 PROCEDURE — 250N000011 HC RX IP 250 OP 636: Performed by: NURSE PRACTITIONER

## 2020-12-07 PROCEDURE — 96372 THER/PROPH/DIAG INJ SC/IM: CPT | Performed by: NURSE PRACTITIONER

## 2020-12-07 PROCEDURE — G0463 HOSPITAL OUTPT CLINIC VISIT: HCPCS | Mod: 25

## 2020-12-07 RX ORDER — HYDROCODONE BITARTRATE AND ACETAMINOPHEN 5; 325 MG/1; MG/1
1 TABLET ORAL EVERY 6 HOURS PRN
COMMUNITY
End: 2021-05-01

## 2020-12-07 RX ORDER — HYDROCODONE BITARTRATE AND ACETAMINOPHEN 5; 325 MG/1; MG/1
1 TABLET ORAL ONCE
Status: COMPLETED | OUTPATIENT
Start: 2020-12-07 | End: 2020-12-07

## 2020-12-07 RX ORDER — ASPIRIN 81 MG/1
81 TABLET, CHEWABLE ORAL DAILY
COMMUNITY

## 2020-12-07 RX ORDER — KETOROLAC TROMETHAMINE 30 MG/ML
30 INJECTION, SOLUTION INTRAMUSCULAR; INTRAVENOUS ONCE
Status: COMPLETED | OUTPATIENT
Start: 2020-12-07 | End: 2020-12-07

## 2020-12-07 RX ADMIN — KETOROLAC TROMETHAMINE 30 MG: 30 INJECTION, SOLUTION INTRAMUSCULAR at 22:22

## 2020-12-07 RX ADMIN — HYDROCODONE BITARTRATE AND ACETAMINOPHEN 1 TABLET: 5; 325 TABLET ORAL at 22:22

## 2020-12-07 ASSESSMENT — ENCOUNTER SYMPTOMS
WEAKNESS: 0
CHILLS: 0
DIZZINESS: 0
WOUND: 1
NUMBNESS: 0
PSYCHIATRIC NEGATIVE: 1
NAUSEA: 0
VOMITING: 0
ARTHRALGIAS: 1
SHORTNESS OF BREATH: 0
FEVER: 0
DIARRHEA: 0
PALPITATIONS: 0
MYALGIAS: 1

## 2020-12-07 NOTE — ED AVS SNAPSHOT
[facility]  [address] [address]  Phone: [phone]                                    Reshma Martinez   MRN: [id number]    Department: [facility]   Date of Visit: 12/7/2020           After Visit Summary Signature Page    I have received my discharge instructions, and my questions have been answered. I have discussed any challenges I see with this plan with the nurse or doctor.    ..........................................................................................................................................  Patient/Patient Representative Signature      ..........................................................................................................................................  Patient Representative Print Name and Relationship to Patient    ..................................................               ................................................  Date                                   Time    ..........................................................................................................................................  Reviewed by Signature/Title    ...................................................              ..............................................  Date                                               Time          22EPIC Rev 08/18
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
## 2020-12-08 NOTE — DISCHARGE INSTRUCTIONS
Call orthopedics in morning regarding increased pain since surgery and when they would like the staples and dressing removed.      - Continue with conservative measures including Rest, Ice, Compression, Elevation, Tylenol and/or Ibuprofen per package instructions for discomfort  (You can alternate Tylenol (acetaminophen) with Advil (ibuprofen).  Example: Ibuprofen 8 AM, Tylenol 12 PM, ibuprofen 4 PM, Tylenol 8 PM, ibuprofen 10 PM, etc.).  Take ibuprofen with food.  - Avoid overusing or exerting which could delay healing and cause further injury    Call your healthcare provider right away if any of these occur:  Fever of 100.4 F (38 C) or higher, or as directed by your healthcare provider  Chills  Increased pain or swelling in the injured body part  Injured body part becomes cold, blue, numb, or tingly  Signs of infection. These include warmth in the skin, redness, drainage, or bad smell coming from the injured body part.  Severe pain or pain that quickly gets worse. This may be hours after the injury.  Pain that doesn't get better after taking pain medicines  Numbness or tingling  Weakness  Problems moving affected muscles  New symptoms  Follow up with primary care provider or return to urgent care/ED with any worsening in condition or additional concerns.

## 2020-12-08 NOTE — ED TRIAGE NOTES
States p[ain con troll is failing without RX. Called for refill but states hasnt gotten one.   Think dressing needs to be rewrapped

## 2020-12-08 NOTE — ED TRIAGE NOTES
Pt presents with her significant other and has a bulky dressing to her left lower leg. Wants her dressing checked and requests pain medication since she ran out of them yesterday.

## 2020-12-09 ENCOUNTER — TRANSFERRED RECORDS (OUTPATIENT)
Dept: HEALTH INFORMATION MANAGEMENT | Facility: CLINIC | Age: 25
End: 2020-12-09

## 2021-01-27 ENCOUNTER — TRANSFERRED RECORDS (OUTPATIENT)
Dept: HEALTH INFORMATION MANAGEMENT | Facility: CLINIC | Age: 26
End: 2021-01-27

## 2021-03-06 ENCOUNTER — TRANSFERRED RECORDS (OUTPATIENT)
Dept: HEALTH INFORMATION MANAGEMENT | Facility: CLINIC | Age: 26
End: 2021-03-06

## 2021-04-03 ENCOUNTER — HEALTH MAINTENANCE LETTER (OUTPATIENT)
Age: 26
End: 2021-04-03

## 2021-05-01 ENCOUNTER — APPOINTMENT (OUTPATIENT)
Dept: GENERAL RADIOLOGY | Facility: HOSPITAL | Age: 26
End: 2021-05-01
Attending: EMERGENCY MEDICINE
Payer: MEDICAID

## 2021-05-01 ENCOUNTER — APPOINTMENT (OUTPATIENT)
Dept: ULTRASOUND IMAGING | Facility: HOSPITAL | Age: 26
End: 2021-05-01
Attending: EMERGENCY MEDICINE
Payer: MEDICAID

## 2021-05-01 ENCOUNTER — HOSPITAL ENCOUNTER (EMERGENCY)
Facility: HOSPITAL | Age: 26
Discharge: HOME | End: 2021-05-01
Attending: EMERGENCY MEDICINE | Admitting: EMERGENCY MEDICINE
Payer: MEDICAID

## 2021-05-01 VITALS
SYSTOLIC BLOOD PRESSURE: 111 MMHG | TEMPERATURE: 97.8 F | HEART RATE: 91 BPM | DIASTOLIC BLOOD PRESSURE: 90 MMHG | RESPIRATION RATE: 18 BRPM | OXYGEN SATURATION: 98 %

## 2021-05-01 DIAGNOSIS — M79.662 PAIN OF LEFT LOWER LEG: ICD-10-CM

## 2021-05-01 PROCEDURE — 73590 X-RAY EXAM OF LOWER LEG: CPT | Mod: LT

## 2021-05-01 PROCEDURE — 99284 EMERGENCY DEPT VISIT MOD MDM: CPT | Performed by: EMERGENCY MEDICINE

## 2021-05-01 PROCEDURE — 250N000011 HC RX IP 250 OP 636: Performed by: EMERGENCY MEDICINE

## 2021-05-01 PROCEDURE — 99284 EMERGENCY DEPT VISIT MOD MDM: CPT | Mod: 25

## 2021-05-01 PROCEDURE — 96372 THER/PROPH/DIAG INJ SC/IM: CPT | Performed by: EMERGENCY MEDICINE

## 2021-05-01 PROCEDURE — 93971 EXTREMITY STUDY: CPT | Mod: LT

## 2021-05-01 RX ORDER — MORPHINE SULFATE 4 MG/ML
4 INJECTION, SOLUTION INTRAMUSCULAR; INTRAVENOUS
Status: COMPLETED | OUTPATIENT
Start: 2021-05-01 | End: 2021-05-01

## 2021-05-01 RX ORDER — HYDROCODONE BITARTRATE AND ACETAMINOPHEN 5; 325 MG/1; MG/1
1 TABLET ORAL EVERY 6 HOURS PRN
Qty: 10 TABLET | Refills: 0 | Status: SHIPPED | OUTPATIENT
Start: 2021-05-01 | End: 2021-05-04

## 2021-05-01 RX ADMIN — MORPHINE SULFATE 4 MG: 4 INJECTION, SOLUTION INTRAMUSCULAR; INTRAVENOUS at 12:13

## 2021-05-01 ASSESSMENT — ENCOUNTER SYMPTOMS
ACTIVITY CHANGE: 0
ABDOMINAL PAIN: 0
DIZZINESS: 0
FATIGUE: 0
SINUS PAIN: 0
CHEST TIGHTNESS: 0
FEVER: 0
SHORTNESS OF BREATH: 0
BACK PAIN: 0
LIGHT-HEADEDNESS: 0
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
## 2021-05-01 NOTE — ED NOTES
Discharge instructions reviewed and patient verbalizes understanding. Rx sent to pharmacy. Educated on safe use of norco. Verbalizes understanding. Has appointment with surgeon this coming week.
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
## 2021-09-12 ENCOUNTER — HEALTH MAINTENANCE LETTER (OUTPATIENT)
Age: 26
End: 2021-09-12

## 2021-10-05 NOTE — ED NOTES
MD at bedside.  
Per Dr. Rosales cuts cleaned/irrigated with saline. Pt's right pinky laceration closed with steri-strips, right ring finger and right hand wound dressed with bacitracin and covered with bandaids. Pt given ice pack for pain. Pt given verbal and written d/c instructions and pt verbalizes understanding. Pt left department ambulatory.   
none

## 2022-04-24 ENCOUNTER — HEALTH MAINTENANCE LETTER (OUTPATIENT)
Age: 27
End: 2022-04-24

## 2022-08-24 NOTE — DISCHARGE INSTRUCTIONS
CC:  Dillon Carson is here today for Office Visit, Back Pain, and Fall     Medications: medications verified, no change  Refills needed today?  NO  denies Latex allergy or sensitivity  Patient would like communication of their results via:      Cell Phone:   Telephone Information:   Mobile [phone]     Okay to leave a message containing results? Yes  Tobacco history: verified  Patient's current [facility] status: Active.    Pharmacy Verified?  Yes    Health Maintenance Due   Topic Date Due   • Hepatitis B Vaccine (1 of 3 - 3-dose series) Never done       Patient is due for topics as listed above but is not proceeding with any at this time.            Take tylenol and/or ibuprofen for pain. Follow dosing on package.   Increase water intake.   Throw tooth brush out in 5 days.   Follow up with any hot potato voice, trouble swallowing, or any increase in symptoms or concerns.   Follow up with PCP with any increase in symptoms or concerns.   Return to urgent care or emergency department with any increase in symptoms or concerns.

## 2022-11-19 ENCOUNTER — HEALTH MAINTENANCE LETTER (OUTPATIENT)
Age: 27
End: 2022-11-19

## 2023-06-01 ENCOUNTER — HEALTH MAINTENANCE LETTER (OUTPATIENT)
Age: 28
End: 2023-06-01

## 2024-05-02 ENCOUNTER — PATIENT OUTREACH (OUTPATIENT)
Dept: CARE COORDINATION | Facility: OTHER | Age: 29
End: 2024-05-02

## 2024-05-02 NOTE — PROGRESS NOTES
Clinic Care Coordination Contact  Care Team Conversations    CC received phone call from EVERTON Davis this date.  Reshma is being released on 5.20.24.  She will be released and will be living in [place].  She will be released on Suboxone.  Needs an appointment for continuation of her Suboxone.  She requested to be seen by Dr. Reardon.  She will be released on CRP - conditional release program.   Appt scheduled for Thursday, May 23, 2024, arrive at 10:30AM.  Instructed release planner to inform Reshma that she will go to lab first - urine and blood draw.  Also provided CC's direct line for Reshma to call with any questions/concerns.   Requested that the DOC fax medical records to Dr. Caren Reardon - fax number [phone].      Suzette Ludwig RN-BSN, [facility] Care Coordinator  [phone]

## 2024-05-07 NOTE — PROGRESS NOTES
CC received a phone call from DOC opioid release planner, [place], this date.  Reshma's release date got pushed back - she is not being released until July 8.  New appointment scheduled, July 11, 2024, arrive at 11:00AM.     Encouraged Rancho to call back with any further questions/concerns.    Suzette Ludwig, RN-BSN, [facility] Care Coordinator  [phone]

## 2024-06-15 ENCOUNTER — HEALTH MAINTENANCE LETTER (OUTPATIENT)
Age: 29
End: 2024-06-15

## 2025-06-21 ENCOUNTER — HEALTH MAINTENANCE LETTER (OUTPATIENT)
Age: 30
End: 2025-06-21